# Patient Record
Sex: MALE | Race: WHITE | Employment: FULL TIME | URBAN - METROPOLITAN AREA
[De-identification: names, ages, dates, MRNs, and addresses within clinical notes are randomized per-mention and may not be internally consistent; named-entity substitution may affect disease eponyms.]

---

## 2017-04-10 ENCOUNTER — PRE VISIT (OUTPATIENT)
Dept: CARDIOLOGY | Facility: CLINIC | Age: 62
End: 2017-04-10

## 2017-04-10 DIAGNOSIS — I25.10 CORONARY ARTERY DISEASE INVOLVING NATIVE CORONARY ARTERY OF NATIVE HEART WITHOUT ANGINA PECTORIS: ICD-10-CM

## 2017-04-10 DIAGNOSIS — I21.9 MYOCARDIAL INFARCTION (H): ICD-10-CM

## 2017-04-10 PROBLEM — E78.00 PURE HYPERCHOLESTEROLEMIA: Status: ACTIVE | Noted: 2017-04-10

## 2017-04-20 DIAGNOSIS — I25.10 CORONARY ARTERY DISEASE INVOLVING NATIVE CORONARY ARTERY OF NATIVE HEART WITHOUT ANGINA PECTORIS: ICD-10-CM

## 2017-04-20 LAB
CHOLEST SERPL-MCNC: 156 MG/DL
HDLC SERPL-MCNC: 35 MG/DL
LDLC SERPL CALC-MCNC: 94 MG/DL
NONHDLC SERPL-MCNC: 121 MG/DL
TRIGL SERPL-MCNC: 133 MG/DL

## 2017-04-20 PROCEDURE — 36415 COLL VENOUS BLD VENIPUNCTURE: CPT | Performed by: NURSE PRACTITIONER

## 2017-04-20 PROCEDURE — 80061 LIPID PANEL: CPT | Performed by: NURSE PRACTITIONER

## 2017-04-24 ENCOUNTER — OFFICE VISIT (OUTPATIENT)
Dept: CARDIOLOGY | Facility: CLINIC | Age: 62
End: 2017-04-24
Attending: NURSE PRACTITIONER
Payer: COMMERCIAL

## 2017-04-24 VITALS
WEIGHT: 210.4 LBS | DIASTOLIC BLOOD PRESSURE: 70 MMHG | SYSTOLIC BLOOD PRESSURE: 110 MMHG | BODY MASS INDEX: 28.5 KG/M2 | HEIGHT: 72 IN | HEART RATE: 56 BPM

## 2017-04-24 DIAGNOSIS — Z72.0 TOBACCO ABUSE: ICD-10-CM

## 2017-04-24 DIAGNOSIS — E78.6 HDL DEFICIENCY: Primary | Chronic | ICD-10-CM

## 2017-04-24 DIAGNOSIS — I25.10 CORONARY ARTERY DISEASE INVOLVING NATIVE CORONARY ARTERY OF NATIVE HEART WITHOUT ANGINA PECTORIS: ICD-10-CM

## 2017-04-24 DIAGNOSIS — E78.2 MIXED HYPERLIPIDEMIA: ICD-10-CM

## 2017-04-24 PROCEDURE — 99213 OFFICE O/P EST LOW 20 MIN: CPT | Performed by: INTERNAL MEDICINE

## 2017-04-24 NOTE — PROGRESS NOTES
HPI and Plan:   See dictation    Orders Placed This Encounter   Procedures     Lipid Profile     Lipid Profile     Follow-Up with Cardiac Advanced Practice Provider     Follow-Up with Cardiologist       No orders of the defined types were placed in this encounter.      There are no discontinued medications.      Encounter Diagnoses   Name Primary?     Coronary artery disease involving native coronary artery of native heart without angina pectoris      HDL deficiency Yes     Mixed hyperlipidemia      Tobacco abuse        CURRENT MEDICATIONS:  Current Outpatient Prescriptions   Medication Sig Dispense Refill     atorvastatin (LIPITOR) 80 MG tablet Take 1 tablet (80 mg) by mouth daily 90 tablet 3     atenolol (TENORMIN) 25 MG tablet Take 1 tablet (25 mg) by mouth daily 90 tablet 3     Loratadine (CLARITIN PO) Possibly with D       IBUPROFEN PO Take  by mouth.       aspirin 81 MG tablet Take  by mouth daily.       valACYclovir (VALTREX) 1000 mg tablet Take 1 tablet by mouth 3 times daily for 7 days. 21 tablet 0       ALLERGIES   No Known Allergies    PAST MEDICAL HISTORY:  Past Medical History:   Diagnosis Date     Back pain      CAD (coronary artery disease)     cardiac cath 2006: MARIBELL to PDA     Herpes pharyngitis      Hyperlipidemia LDL goal < 70      Impaired glucose tolerance      Myocardial infarction (H) 10/2006    inferior     Psoriasis      Tobacco abuse        PAST SURGICAL HISTORY:  Past Surgical History:   Procedure Laterality Date     bilateral cataract extractions      w/ IOL implant     HEART CATH, ANGIOPLASTY  OCt  2006    successful stent to PDa,        FAMILY HISTORY:  Family History   Problem Relation Age of Onset     Other Cancer Mother      MENTAL ILLNESS Father        SOCIAL HISTORY:  Social History     Social History     Marital status:      Spouse name: N/A     Number of children: N/A     Years of education: N/A     Social History Main Topics     Smoking status: Current Every Day Smoker      "Packs/day: 0.50     Types: Cigars     Smokeless tobacco: None      Comment: 5 cigars a day     Alcohol use Yes     Drug use: No     Sexual activity: Not Asked     Other Topics Concern     Parent/Sibling W/ Cabg, Mi Or Angioplasty Before 65f 55m? Yes     Caffeine Concern Yes     02-03 cups a day     Sleep Concern No     Stress Concern No     Weight Concern No     Special Diet Yes     eat healthy      Exercise Yes     golf      Seat Belt Yes     Social History Narrative       Review of Systems:  Skin:  Negative       Eyes:         ENT:  Negative      Respiratory:  Negative       Cardiovascular:    Positive for;edema (only when flying)    Gastroenterology: Negative      Genitourinary:  Negative      Musculoskeletal:  Negative      Neurologic:  Negative      Psychiatric:  Negative      Heme/Lymph/Imm:  Negative      Endocrine:  Negative        Physical Exam:  Vitals: /70  Pulse 56  Ht 1.816 m (5' 11.5\")  Wt 95.4 kg (210 lb 6.4 oz)  BMI 28.94 kg/m2    Constitutional:  cooperative;alert and oriented overweight      Skin:  warm and dry to the touch, no apparent skin lesions or masses noted        Head:  normocephalic, no masses or lesions        Eyes:  pupils equal and round, conjunctivae and lids unremarkable, sclera white, no xanthalasma, EOMS intact, no nystagmus        ENT:  no pallor or cyanosis, dentition good        Neck:  carotid pulses are full and equal bilaterally;no carotid bruit        Chest:  normal breath sounds, clear to auscultation, normal A-P diameter, normal symmetry, normal respiratory excursion, no use of accessory muscles          Cardiac: regular rhythm;normal S1 and S2;no S3 or S4;no murmurs, gallops or rubs detected                  Abdomen:           Vascular: pulses full and equal                                        Extremities and Back:  no edema;no spinal abnormalities noted;normal muscle strength and tone              Neurological:  affect appropriate, oriented to time, person " and place;no gross motor deficits              DORINDA Molina CNP  [facility]  [address]     [place] [id number]

## 2017-04-24 NOTE — MR AVS SNAPSHOT
After Visit Summary   4/24/2017    Kenn Naranjo    MRN: [id number]           Patient Information     Date Of Birth          1955        Visit Information        Provider Department      4/24/2017 1:45 PM Martin Govea MD [facility]        Today's Diagnoses     HDL deficiency    -  1    Coronary artery disease involving native coronary artery of native heart without angina pectoris        Mixed hyperlipidemia        Tobacco abuse           Follow-ups after your visit        Additional Services     Follow-Up with Cardiac Advanced Practice Provider           Follow-Up with Cardiologist                 Future tests that were ordered for you today     Open Future Orders        Priority Expected Expires Ordered    Lipid Profile Routine 4/24/2018 5/29/2018 4/24/2017    Follow-Up with Cardiac Advanced Practice Provider Routine 4/24/2018 9/6/2018 4/24/2017    Lipid Profile Routine 4/24/2019 5/14/2019 4/24/2017    Follow-Up with Cardiologist Routine 4/24/2019 5/14/2019 4/24/2017            Who to contact     If you have questions or need follow up information about today's clinic visit or your schedule please contact [facility] directly at [phone].  Normal or non-critical lab and imaging results will be communicated to you by [employer], letter or phone within 4 business days after the clinic has received the results. If you do not hear from us within 7 days, please contact the clinic through [employer] or phone. If you have a critical or abnormal lab result, we will notify you by phone as soon as possible.  Submit refill requests through [employer] or call your pharmacy and they will forward the refill request to us. Please allow 3 business days for your refill to be completed.          Additional Information About Your Visit        MyChart Information     [employer] lets you send messages to your doctor, view your test  "results, renew your prescriptions, schedule appointments and more. To sign up, go to [email or web address] . Click on \"Log in\" on the left side of the screen, which will take you to the Welcome page. Then click on \"Sign up Now\" on the right side of the page.     You will be asked to enter the access code listed below, as well as some personal information. Please follow the directions to create your username and password.     Your access code is: [email or web address]  Expires: 2017  2:34 PM     Your access code will  in 90 days. If you need help or a new code, please call your [facility] clinic or [phone].        Care EveryWhere ID     This is your Care EveryWhere ID. This could be used by other organizations to access your [facility] medical records  [id number]        Your Vitals Were     Pulse Height BMI (Body Mass Index)             56 1.816 m (5' 11.5\") 28.94 kg/m2          Blood Pressure from Last 3 Encounters:   17 110/70   16 98/58   04/20/15 100/64    Weight from Last 3 Encounters:   17 95.4 kg (210 lb 6.4 oz)   16 91.1 kg (200 lb 12.8 oz)   04/20/15 85.3 kg (188 lb)              We Performed the Following     Follow-Up with Cardiologist        Primary Care Provider    Physician No Ref-Primary       No address on file        Thank you!     Thank you for choosing [facility] [place]  for your care. Our goal is always to provide you with excellent care. Hearing back from our patients is one way we can continue to improve our services. Please take a few minutes to complete the written survey that you may receive in the mail after your visit with us. Thank you!             Your Updated Medication List - Protect others around you: Learn how to safely use, store and throw away your medicines at www.disposemymeds.org.          This list is accurate as of: 17  2:34 PM.  Always use your most recent med list.                   Brand Name Dispense " Instructions for use    aspirin 81 MG tablet      Take  by mouth daily.       atenolol 25 MG tablet    TENORMIN    90 tablet    Take 1 tablet (25 mg) by mouth daily       atorvastatin 80 MG tablet    LIPITOR    90 tablet    Take 1 tablet (80 mg) by mouth daily       CLARITIN PO      Possibly with D       IBUPROFEN PO      Take  by mouth.       valACYclovir 1000 mg tablet    VALTREX    21 tablet    Take 1 tablet by mouth 3 times daily for 7 days.

## 2017-04-25 DIAGNOSIS — I25.10 CORONARY ARTERY DISEASE INVOLVING NATIVE CORONARY ARTERY OF NATIVE HEART WITHOUT ANGINA PECTORIS: ICD-10-CM

## 2017-04-25 RX ORDER — ATENOLOL 25 MG/1
25 TABLET ORAL DAILY
Qty: 90 TABLET | Refills: 3 | Status: SHIPPED | OUTPATIENT
Start: 2017-04-25 | End: 2017-07-25 | Stop reason: ALTCHOICE

## 2017-04-25 NOTE — PROGRESS NOTES
HISTORY OF PRESENT ILLNESS:  Mr. Naranjo is a very nice 62-year-old gentleman with past medical history significant for inferior wall myocardial infarction in 10/2006, treated by stenting of his posterior descending artery.  At that time he was noted to have a 70% stenosis in his left anterior descending artery and a 70% stenosis in the circumflex coronary artery.  When he returned for staged procedure, the stenosis in the left anterior descending artery had improved significantly and it was thought to be more in the 30%-50% range.  His mid circumflex stenosis was still in the 70% range; however, the vessel beyond it was quite small, and we decided to treat him medically.  Ejection fraction improved all the way back to 60% with some inferior wall hypokinesia.  He fortunately quit smoking cigarettes at that time, but still smokes occasional cigars.      His last followup stress test was in 2012 demonstrating a very small area of completely reversible basal inferior defect consistent with a mild amount of ischemia, and it was decided to treat him medically.  Ejection fraction was estimated to be 65%.      Aaron returns to clinic stating he is doing quite well, but he states he has been a total slug.  He did nothing over the wintertime but gain weight and sit around.  He states his wife is still working.  He is retired.  He has got a juan that is now retiring this Friday, and he states they are going to start a health program together with some regular exercise and trying to focus on what they are eating.  He has no chest, arm, neck, jaw or shoulder discomfort, no dyspnea on exertion, orthopnea or PND, no palpitations, lightheadedness, dizziness, syncope or near-syncope.  He did a bunch of yard work over the weekend and had absolutely no symptoms.  He notes no side effects or problems with his current medical regimen.      ASSESSMENT AND PLAN:  Aaron appears to be doing well from a cardiac standpoint without clinical  evidence of ischemia, heart failure or significant arrhythmia.      Blood pressure is very well controlled at 110/70.      Weight, unfortunately, is now up to 210 pounds.  He is up 10 pounds from last year and 12 pounds from the year before.  Body mass index is now 29, pushing towards obesity.      I also pointed out his fasting lipid profile has done the expected seesaw phenomenon.  His total cholesterol has gone up by 20 points to 156, his HDL has gone down from 38 to 35, LDL has gone from 76 up to 94, triglycerides are now up to 133, whereas they were 78 three years ago.  Again, I emphasized the importance of a regular exercise regimen, weight loss and a low-carbohydrate diet.  I offered to consider moving from atorvastatin 80 to Crestor 40, and at this time we decided we will focus on lifestyle.      We reviewed his medications.  We will continue them as is.  I will have him follow up with my ELAINE in 1 year.  I will see him back in 2 years.  If he should have any problems, I would be glad to see him sooner.         VALERIE IRENE MD, [facility]             D: 2017 14:34   T: 2017 04:29   MT: ANKIT      Name:     BABITA JIMENEZ   MRN:      [id number]        Account:      [id number]   :      1955           Service Date: 2017      Document: [id number]

## 2017-06-19 DIAGNOSIS — I25.10 CORONARY ARTERY DISEASE INVOLVING NATIVE CORONARY ARTERY OF NATIVE HEART WITHOUT ANGINA PECTORIS: ICD-10-CM

## 2017-06-19 RX ORDER — ATORVASTATIN CALCIUM 80 MG/1
80 TABLET, FILM COATED ORAL DAILY
Qty: 90 TABLET | Refills: 2 | Status: SHIPPED | OUTPATIENT
Start: 2017-06-19 | End: 2018-03-14

## 2017-07-24 ENCOUNTER — TELEPHONE (OUTPATIENT)
Dept: CARDIOLOGY | Facility: CLINIC | Age: 62
End: 2017-07-24

## 2017-07-24 DIAGNOSIS — I10 BENIGN ESSENTIAL HYPERTENSION: Primary | ICD-10-CM

## 2017-07-24 NOTE — TELEPHONE ENCOUNTER
[facility], [place] called requesting a Substitute  for atenolol 25 mg one tablet daily, which is on nation back order  Last seen 4-24-17 by Dr. Mendez.

## 2017-07-25 RX ORDER — METOPROLOL SUCCINATE 25 MG/1
12.5 TABLET, EXTENDED RELEASE ORAL DAILY
Qty: 15 TABLET | Refills: 1 | Status: SHIPPED | OUTPATIENT
Start: 2017-07-25 | End: 2017-09-21

## 2017-07-25 NOTE — TELEPHONE ENCOUNTER
Spoke with patient and alternative for atenolol reviewed. Patient verbalized understanding of new medication and dosage. Prescription for Metoprolol Succinate 1/2 table (12.5 mg) daily e scribed. Patient will call with any questions or concerns.

## 2017-09-21 DIAGNOSIS — I10 BENIGN ESSENTIAL HYPERTENSION: ICD-10-CM

## 2017-09-21 RX ORDER — METOPROLOL SUCCINATE 25 MG/1
12.5 TABLET, EXTENDED RELEASE ORAL DAILY
Qty: 45 TABLET | Refills: 2 | Status: SHIPPED | OUTPATIENT
Start: 2017-09-21 | End: 2018-04-25

## 2018-03-14 DIAGNOSIS — I25.10 CORONARY ARTERY DISEASE INVOLVING NATIVE CORONARY ARTERY OF NATIVE HEART WITHOUT ANGINA PECTORIS: ICD-10-CM

## 2018-03-14 RX ORDER — ATORVASTATIN CALCIUM 80 MG/1
80 TABLET, FILM COATED ORAL DAILY
Qty: 90 TABLET | Refills: 0 | Status: SHIPPED | OUTPATIENT
Start: 2018-03-14 | End: 2018-04-25

## 2018-04-25 ENCOUNTER — OFFICE VISIT (OUTPATIENT)
Dept: CARDIOLOGY | Facility: CLINIC | Age: 63
End: 2018-04-25
Attending: INTERNAL MEDICINE
Payer: COMMERCIAL

## 2018-04-25 VITALS
HEART RATE: 76 BPM | SYSTOLIC BLOOD PRESSURE: 110 MMHG | DIASTOLIC BLOOD PRESSURE: 69 MMHG | BODY MASS INDEX: 27.22 KG/M2 | HEIGHT: 72 IN | WEIGHT: 201 LBS

## 2018-04-25 DIAGNOSIS — I25.10 CORONARY ARTERY DISEASE INVOLVING NATIVE CORONARY ARTERY OF NATIVE HEART WITHOUT ANGINA PECTORIS: ICD-10-CM

## 2018-04-25 LAB
CHOLEST SERPL-MCNC: 132 MG/DL
HDLC SERPL-MCNC: 32 MG/DL
LDLC SERPL CALC-MCNC: 72 MG/DL
NONHDLC SERPL-MCNC: 100 MG/DL
TRIGL SERPL-MCNC: 139 MG/DL

## 2018-04-25 PROCEDURE — 36415 COLL VENOUS BLD VENIPUNCTURE: CPT | Performed by: INTERNAL MEDICINE

## 2018-04-25 PROCEDURE — 80061 LIPID PANEL: CPT | Performed by: INTERNAL MEDICINE

## 2018-04-25 PROCEDURE — 99213 OFFICE O/P EST LOW 20 MIN: CPT | Performed by: NURSE PRACTITIONER

## 2018-04-25 RX ORDER — ATENOLOL 25 MG/1
25 TABLET ORAL DAILY
Qty: 90 TABLET | Refills: 3 | Status: SHIPPED | OUTPATIENT
Start: 2018-04-25 | End: 2019-04-22

## 2018-04-25 RX ORDER — ATORVASTATIN CALCIUM 80 MG/1
80 TABLET, FILM COATED ORAL DAILY
Qty: 90 TABLET | Refills: 3 | Status: SHIPPED | OUTPATIENT
Start: 2018-04-25 | End: 2019-06-12

## 2018-04-25 NOTE — LETTER
4/25/2018    Physician No Ref-Primary  No address on file    RE: Kenn Mercermitz       Dear Colleague,    I had the pleasure of seeing Kenn Naranjo in the [facility].    HPI and Plan:   I had the pleasure of seeing Kenn Naranjo today in cardiology annual follow-up.  He is a pleasant 63-year-old patient of Dr. Govea's.  His past medical history is significant for inferior wall myocardial infarction in October 2006 treated with stenting of his posterior descending artery.  He was noted at that time to have a 70% LAD stenosis and a 70% circumflex stenosis, he returned for a staged procedure, on relook the LAD had improved significantly and was thought to be more than 30-50% range.  His circumflex stenosis was still 70%, but it was decided to treat medically given the distal vessel was quite small.  His EF improved to 60% with some inferior wall hypokinesis.  He quit smoking cigarettes at that time, he occasionally smokes a cigar.    He had a stress echo 2012 demonstrating a very small area of completely reversible basal inferior defect consistent with a mild amount of ischemia, gas was treated medically and his EF was normal.    He saw Dr. Govea last year and was doing well but he gained at least 10 pounds and was admittedly not exercising.  Fortunately he is managed to take off most of that weight and is back down to 201 pounds today.  He says the last couple of months he has an exercise but before that he was.  He plans to start again, he is golfing this weekend and plans to walk and carry his back.  He has not had any exertional chest pain, shortness of breath, PND or orthopnea.  He was recently shoveling or heavy wet snow and had no symptoms with this.  He has chronic bilateral puffy ankles, they are unchanged and do not bother him.    Labs Reviewed: , HDL 32, LDL 72, triglycerides 130    Physical Exam  Please see Below     Assessment and Plan  1.  Coronary artery  disease with previous posterior descending artery stenting in 2006, his return angiogram for a staged procedure showed that the LAD artery stenosis had improved to 30-50% and his circumflex 70% stenosis was treated medically.  He continues to do well.  Is not having any ischemic symptoms.  He has no limitations.  He continues on atenolol, Lipitor and aspirin.  Of note he was previously on atenolol, but last summer there was a shortage and he was switched to Toprol XL 12.5 mg, he does not like cutting this large tablet in half and request to go back on atenolol 25, which I prescribed for him today.  I congratulated him on his 10 pound weight loss and encouraged him to continue to exercise and lose weight.  His blood pressure is well controlled.  He should follow-up with Dr. Govea in one years time with lipids prior.      DORINDA Oneal, CNP      No orders of the defined types were placed in this encounter.    Orders Placed This Encounter   Medications     atenolol (TENORMIN) 25 MG tablet     Sig: Take 1 tablet (25 mg) by mouth daily     Dispense:  90 tablet     Refill:  3     atorvastatin (LIPITOR) 80 MG tablet     Sig: Take 1 tablet (80 mg) by mouth daily     Dispense:  90 tablet     Refill:  3     Medications Discontinued During This Encounter   Medication Reason     valACYclovir (VALTREX) 1000 mg tablet Stopped by Patient     metoprolol (TOPROL-XL) 25 MG 24 hr tablet      atorvastatin (LIPITOR) 80 MG tablet Reorder         CURRENT MEDICATIONS:  Current Outpatient Prescriptions   Medication Sig Dispense Refill     aspirin 81 MG tablet Take  by mouth daily.       atenolol (TENORMIN) 25 MG tablet Take 1 tablet (25 mg) by mouth daily 90 tablet 3     atorvastatin (LIPITOR) 80 MG tablet Take 1 tablet (80 mg) by mouth daily 90 tablet 3     IBUPROFEN PO Take  by mouth.       Loratadine (CLARITIN PO) Possibly with D       [DISCONTINUED] atorvastatin (LIPITOR) 80 MG tablet Take 1 tablet (80 mg) by mouth daily 90  "tablet 0       ALLERGIES   No Known Allergies    PAST MEDICAL HISTORY:  Past Medical History:   Diagnosis Date     Back pain      CAD (coronary artery disease)     cardiac cath 2006: MARIBELL to PDA     Herpes pharyngitis      Hyperlipidemia LDL goal < 70      Impaired glucose tolerance      Myocardial infarction 10/2006    inferior     Psoriasis      Tobacco abuse        PAST SURGICAL HISTORY:  Past Surgical History:   Procedure Laterality Date     bilateral cataract extractions      w/ IOL implant     HEART CATH, ANGIOPLASTY  OCt  2006    successful stent to PDa,        FAMILY HISTORY:  Family History   Problem Relation Age of Onset     Other Cancer Mother      MENTAL ILLNESS Father        SOCIAL HISTORY:  Social History     Social History     Marital status:      Spouse name: N/A     Number of children: N/A     Years of education: N/A     Social History Main Topics     Smoking status: Current Every Day Smoker     Packs/day: 0.50     Types: Cigars     Smokeless tobacco: Never Used      Comment: 5 cigars a day     Alcohol use Yes     Drug use: No     Sexual activity: Not Asked     Other Topics Concern     Parent/Sibling W/ Cabg, Mi Or Angioplasty Before 65f 55m? Yes     Caffeine Concern Yes     02-03 cups a day     Sleep Concern No     Stress Concern No     Weight Concern No     Special Diet Yes     eat healthy      Exercise Yes     golf      Seat Belt Yes     Social History Narrative       Review of Systems:  Skin:  Negative       Eyes:  Negative      ENT:  Negative      Respiratory:  Negative       Cardiovascular:  Negative      Gastroenterology: Negative      Genitourinary:  Negative      Musculoskeletal:  Negative      Neurologic:  Negative      Psychiatric:  Negative      Heme/Lymph/Imm:  Negative      Endocrine:  Negative        Physical Exam:  Vitals: /69  Pulse 76  Ht 1.816 m (5' 11.5\")  Wt 91.2 kg (201 lb)  BMI 27.64 kg/m2    Constitutional:  cooperative;in no acute distress        Skin:  warm " and dry to the touch, no apparent skin lesions or masses noted          Head:  normocephalic, no masses or lesions        Eyes:  pupils equal and round, conjunctivae and lids unremarkable, sclera white, no xanthalasma, EOMS intact, no nystagmus        Lymph:      ENT:  no pallor or cyanosis, dentition good        Neck:  carotid pulses are full and equal bilaterally;no carotid bruit        Respiratory:  normal breath sounds, clear to auscultation, normal A-P diameter, normal symmetry, normal respiratory excursion, no use of accessory muscles         Cardiac: regular rhythm;normal S1 and S2;no S3 or S4;no murmurs, gallops or rubs detected                pulses full and equal                                        GI:  abdomen soft        Extremities and Muscular Skeletal:  no edema;no spinal abnormalities noted;normal muscle strength and tone              Neurological:  no gross motor deficits;affect appropriate        Psych:  Alert and Oriented x 3    Encounter Diagnosis   Name Primary?     Coronary artery disease involving native coronary artery of native heart without angina pectoris        Recent Lab Results:  LIPID RESULTS:  Lab Results   Component Value Date    CHOL 132 04/25/2018    HDL 32 (L) 04/25/2018    LDL 72 04/25/2018    TRIG 139 04/25/2018    CHOLHDLRATIO 3.4 04/20/2015       LIVER ENZYME RESULTS:  Lab Results   Component Value Date    AST 25 05/04/2006    ALT <5 (L) 11/20/2014       CBC RESULTS:  Lab Results   Component Value Date    WBC 10.1 06/15/2013    RBC 5.17 06/15/2013    HGB 16.0 06/15/2013    HCT 46.1 06/15/2013    MCV 89 06/15/2013    MCH 30.9 06/15/2013    MCHC 34.7 06/15/2013    RDW 13.1 06/15/2013     06/15/2013       BMP RESULTS:  Lab Results   Component Value Date     06/15/2013    POTASSIUM 4.0 06/15/2013    CHLORIDE 104 06/15/2013    CO2 22 06/15/2013    ANIONGAP 10 06/15/2013     (H) 06/15/2013    BUN 16 06/15/2013    CR 0.83 06/15/2013    GFRESTIMATED >90  06/15/2013    GFRESTBLACK >90 06/15/2013    CHIP 9.5 06/15/2013        A1C RESULTS:  No results found for: A1C    INR RESULTS:  Lab Results   Component Value Date    INR 0.96 11/20/2006    INR 0.99 10/31/2006           CC  Martin Govea MD  [address]  [place] [id number]                  Thank you for allowing me to participate in the care of your patient.      Sincerely,     DORINDA [facility]    cc:   Martin Govea MD  [address]  [place] [id number]

## 2018-04-25 NOTE — MR AVS SNAPSHOT
"              After Visit Summary   2018    Kenn Naranjo    MRN: [id number]           Patient Information     Date Of Birth          1955        Visit Information        Provider Department      2018 10:00 AM Dania Rodríguez APRN CNP [facility]        Today's Diagnoses     Coronary artery disease involving native coronary artery of native heart without angina pectoris           Follow-ups after your visit        Who to contact     If you have questions or need follow up information about today's clinic visit or your schedule please contact [facility] directly at [phone].  Normal or non-critical lab and imaging results will be communicated to you by [employer], letter or phone within 4 business days after the clinic has received the results. If you do not hear from us within 7 days, please contact the clinic through [employer] or phone. If you have a critical or abnormal lab result, we will notify you by phone as soon as possible.  Submit refill requests through [employer] or call your pharmacy and they will forward the refill request to us. Please allow 3 business days for your refill to be completed.          Additional Information About Your Visit        MyChart Information     [employer] lets you send messages to your doctor, view your test results, renew your prescriptions, schedule appointments and more. To sign up, go to [email or web address] . Click on \"Log in\" on the left side of the screen, which will take you to the Welcome page. Then click on \"Sign up Now\" on the right side of the page.     You will be asked to enter the access code listed below, as well as some personal information. Please follow the directions to create your username and password.     Your access code is: [email or web address]  Expires: 2018 10:13 AM     Your access code will  in 90 days. If you need help or a new code, please call " "your [place] clinic or [phone].        Care EveryWhere ID     This is your Care EveryWhere ID. This could be used by other organizations to access your [place] medical records  [id number]        Your Vitals Were     Pulse Height BMI (Body Mass Index)             76 1.816 m (5' 11.5\") 27.64 kg/m2          Blood Pressure from Last 3 Encounters:   04/25/18 110/69   04/24/17 110/70   04/18/16 98/58    Weight from Last 3 Encounters:   04/25/18 91.2 kg (201 lb)   04/24/17 95.4 kg (210 lb 6.4 oz)   04/18/16 91.1 kg (200 lb 12.8 oz)              We Performed the Following     Follow-Up with Cardiac Advanced Practice Provider          Today's Medication Changes          These changes are accurate as of 4/25/18 10:13 AM.  If you have any questions, ask your nurse or doctor.               Start taking these medicines.        Dose/Directions    atenolol 25 MG tablet   Commonly known as:  TENORMIN   Used for:  Coronary artery disease involving native coronary artery of native heart without angina pectoris   Started by:  Dania Rodríguez APRN CNP        Dose:  25 mg   Take 1 tablet (25 mg) by mouth daily   Quantity:  90 tablet   Refills:  3         Stop taking these medicines if you haven't already. Please contact your care team if you have questions.     metoprolol succinate 25 MG 24 hr tablet   Commonly known as:  TOPROL-XL   Stopped by:  Dania Rodríguez APRN CNP                Where to get your medicines      These medications were sent to [place] [id number] [place] [id number]  [address] [facility] [address]     Phone:  [phone]     atenolol 25 MG tablet    atorvastatin 80 MG tablet                Primary Care Provider Fax #    Physician No Ref-Primary [phone]       No address on file        Equal Access to Services     JULIEN HILLIARD AH: Malgorzata Finley, waaxda luqadaha, qaybta kaalmada adeegyada, luan guzman. So [facility] " [phone].    ATENCIÓN: Si norberto fry, tiene a ortiz disposición servicios gratuitos de asistencia lingüística. Octavia al [phone].    We comply with applicable federal civil rights laws and [place] laws. We do not discriminate on the basis of race, color, national origin, age, disability, sex, sexual orientation, or gender identity.            Thank you!     Thank you for choosing [facility]  for your care. Our goal is always to provide you with excellent care. Hearing back from our patients is one way we can continue to improve our services. Please take a few minutes to complete the written survey that you may receive in the mail after your visit with us. Thank you!             Your Updated Medication List - Protect others around you: Learn how to safely use, store and throw away your medicines at www.disposemymeds.org.          This list is accurate as of 4/25/18 10:13 AM.  Always use your most recent med list.                   Brand Name Dispense Instructions for use Diagnosis    aspirin 81 MG tablet      Take  by mouth daily.        atenolol 25 MG tablet    TENORMIN    90 tablet    Take 1 tablet (25 mg) by mouth daily    Coronary artery disease involving native coronary artery of native heart without angina pectoris       atorvastatin 80 MG tablet    LIPITOR    90 tablet    Take 1 tablet (80 mg) by mouth daily    Coronary artery disease involving native coronary artery of native heart without angina pectoris       CLARITIN PO      Possibly with D        IBUPROFEN PO      Take  by mouth.

## 2018-04-25 NOTE — PROGRESS NOTES
HPI and Plan:   I had the pleasure of seeing Kenn Naranjo today in cardiology annual follow-up.  He is a pleasant 63-year-old patient of Dr. Govea's.  His past medical history is significant for inferior wall myocardial infarction in October 2006 treated with stenting of his posterior descending artery.  He was noted at that time to have a 70% LAD stenosis and a 70% circumflex stenosis, he returned for a staged procedure, on relook the LAD had improved significantly and was thought to be more than 30-50% range.  His circumflex stenosis was still 70%, but it was decided to treat medically given the distal vessel was quite small.  His EF improved to 60% with some inferior wall hypokinesis.  He quit smoking cigarettes at that time, he occasionally smokes a cigar.    He had a stress echo 2012 demonstrating a very small area of completely reversible basal inferior defect consistent with a mild amount of ischemia, gas was treated medically and his EF was normal.    He saw Dr. Govea last year and was doing well but he gained at least 10 pounds and was admittedly not exercising.  Fortunately he is managed to take off most of that weight and is back down to 201 pounds today.  He says the last couple of months he has an exercise but before that he was.  He plans to start again, he is golfing this weekend and plans to walk and carry his back.  He has not had any exertional chest pain, shortness of breath, PND or orthopnea.  He was recently shoveling or heavy wet snow and had no symptoms with this.  He has chronic bilateral puffy ankles, they are unchanged and do not bother him.    Labs Reviewed: , HDL 32, LDL 72, triglycerides 130    Physical Exam  Please see Below     Assessment and Plan  1.  Coronary artery disease with previous posterior descending artery stenting in 2006, his return angiogram for a staged procedure showed that the LAD artery stenosis had improved to 30-50% and his circumflex 70% stenosis was  treated medically.  He continues to do well.  Is not having any ischemic symptoms.  He has no limitations.  He continues on atenolol, Lipitor and aspirin.  Of note he was previously on atenolol, but last summer there was a shortage and he was switched to Toprol XL 12.5 mg, he does not like cutting this large tablet in half and request to go back on atenolol 25, which I prescribed for him today.  I congratulated him on his 10 pound weight loss and encouraged him to continue to exercise and lose weight.  His blood pressure is well controlled.  He should follow-up with Dr. Govea in one years time with lipids prior.      DORINDA Oneal, CNP      No orders of the defined types were placed in this encounter.    Orders Placed This Encounter   Medications     atenolol (TENORMIN) 25 MG tablet     Sig: Take 1 tablet (25 mg) by mouth daily     Dispense:  90 tablet     Refill:  3     atorvastatin (LIPITOR) 80 MG tablet     Sig: Take 1 tablet (80 mg) by mouth daily     Dispense:  90 tablet     Refill:  3     Medications Discontinued During This Encounter   Medication Reason     valACYclovir (VALTREX) 1000 mg tablet Stopped by Patient     metoprolol (TOPROL-XL) 25 MG 24 hr tablet      atorvastatin (LIPITOR) 80 MG tablet Reorder         CURRENT MEDICATIONS:  Current Outpatient Prescriptions   Medication Sig Dispense Refill     aspirin 81 MG tablet Take  by mouth daily.       atenolol (TENORMIN) 25 MG tablet Take 1 tablet (25 mg) by mouth daily 90 tablet 3     atorvastatin (LIPITOR) 80 MG tablet Take 1 tablet (80 mg) by mouth daily 90 tablet 3     IBUPROFEN PO Take  by mouth.       Loratadine (CLARITIN PO) Possibly with D       [DISCONTINUED] atorvastatin (LIPITOR) 80 MG tablet Take 1 tablet (80 mg) by mouth daily 90 tablet 0       ALLERGIES   No Known Allergies    PAST MEDICAL HISTORY:  Past Medical History:   Diagnosis Date     Back pain      CAD (coronary artery disease)     cardiac cath 2006: MARIBELL to PDA     Herpes  "pharyngitis      Hyperlipidemia LDL goal < 70      Impaired glucose tolerance      Myocardial infarction 10/2006    inferior     Psoriasis      Tobacco abuse        PAST SURGICAL HISTORY:  Past Surgical History:   Procedure Laterality Date     bilateral cataract extractions      w/ IOL implant     HEART CATH, ANGIOPLASTY  OCt  2006    successful stent to PDa,        FAMILY HISTORY:  Family History   Problem Relation Age of Onset     Other Cancer Mother      MENTAL ILLNESS Father        SOCIAL HISTORY:  Social History     Social History     Marital status:      Spouse name: N/A     Number of children: N/A     Years of education: N/A     Social History Main Topics     Smoking status: Current Every Day Smoker     Packs/day: 0.50     Types: Cigars     Smokeless tobacco: Never Used      Comment: 5 cigars a day     Alcohol use Yes     Drug use: No     Sexual activity: Not Asked     Other Topics Concern     Parent/Sibling W/ Cabg, Mi Or Angioplasty Before 65f 55m? Yes     Caffeine Concern Yes     02-03 cups a day     Sleep Concern No     Stress Concern No     Weight Concern No     Special Diet Yes     eat healthy      Exercise Yes     golf      Seat Belt Yes     Social History Narrative       Review of Systems:  Skin:  Negative       Eyes:  Negative      ENT:  Negative      Respiratory:  Negative       Cardiovascular:  Negative      Gastroenterology: Negative      Genitourinary:  Negative      Musculoskeletal:  Negative      Neurologic:  Negative      Psychiatric:  Negative      Heme/Lymph/Imm:  Negative      Endocrine:  Negative        Physical Exam:  Vitals: /69  Pulse 76  Ht 1.816 m (5' 11.5\")  Wt 91.2 kg (201 lb)  BMI 27.64 kg/m2    Constitutional:  cooperative;in no acute distress        Skin:  warm and dry to the touch, no apparent skin lesions or masses noted          Head:  normocephalic, no masses or lesions        Eyes:  pupils equal and round, conjunctivae and lids unremarkable, sclera white, no " xanthalasma, EOMS intact, no nystagmus        Lymph:      ENT:  no pallor or cyanosis, dentition good        Neck:  carotid pulses are full and equal bilaterally;no carotid bruit        Respiratory:  normal breath sounds, clear to auscultation, normal A-P diameter, normal symmetry, normal respiratory excursion, no use of accessory muscles         Cardiac: regular rhythm;normal S1 and S2;no S3 or S4;no murmurs, gallops or rubs detected                pulses full and equal                                        GI:  abdomen soft        Extremities and Muscular Skeletal:  no edema;no spinal abnormalities noted;normal muscle strength and tone              Neurological:  no gross motor deficits;affect appropriate        Psych:  Alert and Oriented x 3    Encounter Diagnosis   Name Primary?     Coronary artery disease involving native coronary artery of native heart without angina pectoris        Recent Lab Results:  LIPID RESULTS:  Lab Results   Component Value Date    CHOL 132 04/25/2018    HDL 32 (L) 04/25/2018    LDL 72 04/25/2018    TRIG 139 04/25/2018    CHOLHDLRATIO 3.4 04/20/2015       LIVER ENZYME RESULTS:  Lab Results   Component Value Date    AST 25 05/04/2006    ALT <5 (L) 11/20/2014       CBC RESULTS:  Lab Results   Component Value Date    WBC 10.1 06/15/2013    RBC 5.17 06/15/2013    HGB 16.0 06/15/2013    HCT 46.1 06/15/2013    MCV 89 06/15/2013    MCH 30.9 06/15/2013    MCHC 34.7 06/15/2013    RDW 13.1 06/15/2013     06/15/2013       BMP RESULTS:  Lab Results   Component Value Date     06/15/2013    POTASSIUM 4.0 06/15/2013    CHLORIDE 104 06/15/2013    CO2 22 06/15/2013    ANIONGAP 10 06/15/2013     (H) 06/15/2013    BUN 16 06/15/2013    CR 0.83 06/15/2013    GFRESTIMATED >90 06/15/2013    GFRESTBLACK >90 06/15/2013    CHIP 9.5 06/15/2013        A1C RESULTS:  No results found for: A1C    INR RESULTS:  Lab Results   Component Value Date    INR 0.96 11/20/2006    INR 0.99 10/31/2006            CC  Martin Govea MD  [address]  [place] [id number]

## 2019-04-22 DIAGNOSIS — I25.10 CORONARY ARTERY DISEASE INVOLVING NATIVE CORONARY ARTERY OF NATIVE HEART WITHOUT ANGINA PECTORIS: ICD-10-CM

## 2019-04-22 RX ORDER — ATENOLOL 25 MG/1
25 TABLET ORAL DAILY
Qty: 90 TABLET | Refills: 0 | Status: SHIPPED | OUTPATIENT
Start: 2019-04-22 | End: 2019-07-22

## 2019-05-17 ENCOUNTER — PRE VISIT (OUTPATIENT)
Dept: CARDIOLOGY | Facility: CLINIC | Age: 64
End: 2019-05-17

## 2019-05-17 DIAGNOSIS — E78.2 MIXED HYPERLIPIDEMIA: Primary | ICD-10-CM

## 2019-05-17 DIAGNOSIS — R60.0 PERIPHERAL EDEMA: ICD-10-CM

## 2019-06-12 DIAGNOSIS — I25.10 CORONARY ARTERY DISEASE INVOLVING NATIVE CORONARY ARTERY OF NATIVE HEART WITHOUT ANGINA PECTORIS: ICD-10-CM

## 2019-06-12 RX ORDER — ATORVASTATIN CALCIUM 80 MG/1
80 TABLET, FILM COATED ORAL DAILY
Qty: 90 TABLET | Refills: 0 | Status: SHIPPED | OUTPATIENT
Start: 2019-06-12 | End: 2019-06-14

## 2019-06-14 ENCOUNTER — OFFICE VISIT (OUTPATIENT)
Dept: CARDIOLOGY | Facility: CLINIC | Age: 64
End: 2019-06-14
Payer: COMMERCIAL

## 2019-06-14 VITALS
OXYGEN SATURATION: 97 % | SYSTOLIC BLOOD PRESSURE: 94 MMHG | HEART RATE: 50 BPM | DIASTOLIC BLOOD PRESSURE: 60 MMHG | WEIGHT: 203 LBS | BODY MASS INDEX: 27.92 KG/M2

## 2019-06-14 DIAGNOSIS — E78.6 HDL DEFICIENCY: Chronic | ICD-10-CM

## 2019-06-14 DIAGNOSIS — I87.2 VENOUS (PERIPHERAL) INSUFFICIENCY: Chronic | ICD-10-CM

## 2019-06-14 DIAGNOSIS — R60.0 PERIPHERAL EDEMA: ICD-10-CM

## 2019-06-14 DIAGNOSIS — E78.2 MIXED HYPERLIPIDEMIA: ICD-10-CM

## 2019-06-14 DIAGNOSIS — I25.10 CORONARY ARTERY DISEASE INVOLVING NATIVE CORONARY ARTERY OF NATIVE HEART WITHOUT ANGINA PECTORIS: Primary | ICD-10-CM

## 2019-06-14 LAB
CHOLEST SERPL-MCNC: 123 MG/DL
HDLC SERPL-MCNC: 33 MG/DL
LDLC SERPL CALC-MCNC: 74 MG/DL
NONHDLC SERPL-MCNC: 90 MG/DL
TRIGL SERPL-MCNC: 78 MG/DL

## 2019-06-14 PROCEDURE — 99214 OFFICE O/P EST MOD 30 MIN: CPT | Performed by: INTERNAL MEDICINE

## 2019-06-14 PROCEDURE — 80061 LIPID PANEL: CPT | Performed by: INTERNAL MEDICINE

## 2019-06-14 PROCEDURE — 36415 COLL VENOUS BLD VENIPUNCTURE: CPT | Performed by: INTERNAL MEDICINE

## 2019-06-14 RX ORDER — ROSUVASTATIN CALCIUM 40 MG/1
40 TABLET, COATED ORAL DAILY
Qty: 90 TABLET | Refills: 3 | Status: SHIPPED | OUTPATIENT
Start: 2019-06-14 | End: 2019-07-16 | Stop reason: SINTOL

## 2019-06-14 NOTE — LETTER
6/14/2019    Physician No Ref-Primary  No address on file    RE: Kenn Naranjo       Dear Colleague,    I had the pleasure of seeing Kenn Naranjo in the [facility].    HPI and Plan:   See dictation    Orders Placed This Encounter   Procedures     Lipid Profile     Lipid Profile     ALT     Follow-Up with Cardiac Advanced Practice Provider     Follow-Up with Cardiologist       Orders Placed This Encounter   Medications     rosuvastatin (CRESTOR) 40 MG tablet     Sig: Take 1 tablet (40 mg) by mouth daily     Dispense:  90 tablet     Refill:  3       Medications Discontinued During This Encounter   Medication Reason     atorvastatin (LIPITOR) 80 MG tablet          Encounter Diagnoses   Name Primary?     Coronary artery disease involving native coronary artery of native heart without angina pectoris Yes     Mixed hyperlipidemia      HDL deficiency      Peripheral edema      Venous (peripheral) insufficiency        CURRENT MEDICATIONS:  Current Outpatient Medications   Medication Sig Dispense Refill     aspirin 81 MG tablet Take  by mouth daily.       atenolol (TENORMIN) 25 MG tablet Take 1 tablet (25 mg) by mouth daily 90 tablet 0     IBUPROFEN PO Take  by mouth.       Loratadine (CLARITIN PO) Possibly with D       rosuvastatin (CRESTOR) 40 MG tablet Take 1 tablet (40 mg) by mouth daily 90 tablet 3       ALLERGIES   No Known Allergies    PAST MEDICAL HISTORY:  Past Medical History:   Diagnosis Date     Back pain      CAD (coronary artery disease)     cardiac cath 2006: MARIBELL to PDA     Herpes pharyngitis      Hyperlipidemia LDL goal < 70      Impaired glucose tolerance      Myocardial infarction (H) 10/2006    inferior     Peripheral edema      Psoriasis      Tobacco abuse        PAST SURGICAL HISTORY:  Past Surgical History:   Procedure Laterality Date     bilateral cataract extractions      w/ IOL implant     HEART CATH, ANGIOPLASTY  OCt  2006    successful stent to PDa,        FAMILY  HISTORY:  Family History   Problem Relation Age of Onset     Other Cancer Mother      Mental Illness Father        SOCIAL HISTORY:  Social History     Socioeconomic History     Marital status:      Spouse name: None     Number of children: None     Years of education: None     Highest education level: None   Occupational History     None   Social Needs     Financial resource strain: None     Food insecurity:     Worry: None     Inability: None     Transportation needs:     Medical: None     Non-medical: None   Tobacco Use     Smoking status: Current Every Day Smoker     Packs/day: 0.50     Types: Cigars     Smokeless tobacco: Never Used     Tobacco comment: 5 cigars a day   Substance and Sexual Activity     Alcohol use: Yes     Drug use: No     Sexual activity: None   Lifestyle     Physical activity:     Days per week: None     Minutes per session: None     Stress: None   Relationships     Social connections:     Talks on phone: None     Gets together: None     Attends Congregational service: None     Active member of club or organization: None     Attends meetings of clubs or organizations: None     Relationship status: None     Intimate partner violence:     Fear of current or ex partner: None     Emotionally abused: None     Physically abused: None     Forced sexual activity: None   Other Topics Concern     Parent/sibling w/ CABG, MI or angioplasty before 65F 55M? Yes      Service Not Asked     Blood Transfusions Not Asked     Caffeine Concern Yes     Comment: 02-03 cups a day     Occupational Exposure Not Asked     Hobby Hazards Not Asked     Sleep Concern No     Stress Concern No     Weight Concern No     Special Diet Yes     Comment: eat healthy      Back Care Not Asked     Exercise Yes     Comment: golf      Bike Helmet Not Asked     Seat Belt Yes     Self-Exams Not Asked   Social History Narrative     None       Review of Systems:  Skin:  Negative       Eyes:  Negative      ENT:  Negative       Respiratory:  Negative       Cardiovascular:  Negative Positive for;edema    Gastroenterology: Negative      Genitourinary:         Musculoskeletal:  Negative      Neurologic:  Negative      Psychiatric:         Heme/Lymph/Imm:  Negative      Endocrine:  Negative        Physical Exam:  Vitals: BP 94/60   Pulse 50   Wt 92.1 kg (203 lb)   SpO2 97%   BMI 27.92 kg/m       Constitutional:  cooperative;in no acute distress overweight      Skin:  warm and dry to the touch, no apparent skin lesions or masses noted          Head:  normocephalic, no masses or lesions        Eyes:  pupils equal and round, conjunctivae and lids unremarkable, sclera white, no xanthalasma, EOMS intact, no nystagmus xanthalasma      Lymph:      ENT:  no pallor or cyanosis, dentition good        Neck:  carotid pulses are full and equal bilaterally;no carotid bruit        Respiratory:  normal breath sounds, clear to auscultation, normal A-P diameter, normal symmetry, normal respiratory excursion, no use of accessory muscles         Cardiac: regular rhythm;normal S1 and S2;no S3 or S4;no murmurs, gallops or rubs detected                pulses full and equal                                        GI:           Extremities and Muscular Skeletal:  no spinal abnormalities noted;normal muscle strength and tone   bilateral LE edema;trace          Neurological:  no gross motor deficits        Psych:  affect appropriate, oriented to time, person and place        CC  No referring provider defined for this encounter.                Thank you for allowing me to participate in the care of your patient.      Sincerely,     Martin Govea MD     [facility]    cc:   No referring provider defined for this encounter.

## 2019-06-14 NOTE — PROGRESS NOTES
Service Date: 06/14/2019      HISTORY OF PRESENT ILLNESS:  Mr. Nraanjo is a very nice 64-year-old gentleman with past medical history significant for an inferior wall myocardial infarction in 2006, treated by stenting of his posterior descending artery.  At that time he was noted to have a 70% stenosis in his left anterior descending artery and a 70% stenosis in his circumflex coronary artery.  When he returned for the staged intervention, the left anterior descending artery improved significantly, was thought to be more in the 30%-50% range.  His mid circumflex stenosis was still in the 70% range; however, the vessel beyond that was quite small, we decided to treat him medically.  Ejection fraction had improved all the way back to 60% with some inferior wall hypokinesia.  He fortunately quit smoking cigarettes at that time, but still smokes an occasional cigar.      Last evaluation of his coronary anatomy was a stress nuclear scan in 2012.  This demonstrated a very small area of completely reversible basal inferior defect consistent with a small amount of ischemia and we decided to treat him medically.  Again, ejection fraction was estimated to be 65%.      Aaron returns to clinic stating he is doing quite well.  He has no chest, arm, neck, jaw or shoulder discomfort.  No dyspnea on exertion, orthopnea or PND.  No palpitations, lightheadedness, dizziness, syncope or near-syncope.      Since I saw him last 2 years ago, he states he has been walking on a regular basis and has been much more intentional about what he eats and has lost 8 pounds.  He states his weight was actually lower, but had a brutal winter and did not do quite as well and gained some of the weight back.      ASSESSMENT AND PLAN:  Aaron appears to be doing quite well from a cardiac standpoint without clinical evidence of ischemia, heart failure or significant arrhythmia.      Blood pressure is low today at 94/60.  He is asymptomatic.  He is fasting  so this is probably artificially low, but we will continue with his atenolol as is.      Fasting lipid profile is the best he has ever had.  Total cholesterol is down from 123, down to 33 points over the last 2 years.  HDL remains low at 33.  LDL is 74, triglycerides are 78, and I pointed out that dramatic improvement steadily over the last 2 years with his walking regimen and more intentional eating and weight loss.  I have encouraged him to continue to do so.        We also again talked about it and decided we are going to proceed with changing from atorvastatin 80 mg to rosuvastatin 40 mg as this will probably be better at raising his HDL.  I have told him to finish up his current atorvastatin and then we will switch him over to rosuvastatin.  After he has been on the rosuvastatin 4-6 weeks, we will recheck a fasting lipid profile.  I have encouraged him to continue with his walking regimen and his intentional diet.  I will have him see an ELAINE in 1 year.  I will see him back in 2 years.  If he should have any problems, I would be glad to see him sooner.      Thank you for allowing me to participate in his care.         VALERIE IRENE MD, [facility]             D: 2019   T: 2019   MT: KENNETH      Name:     BABITA JIMENEZ   MRN:      [id number]        Account:      [id number]   :      1955           Service Date: 2019      Document: [id number]

## 2019-06-14 NOTE — PROGRESS NOTES
HPI and Plan:   See dictation    Orders Placed This Encounter   Procedures     Lipid Profile     Lipid Profile     ALT     Follow-Up with Cardiac Advanced Practice Provider     Follow-Up with Cardiologist       Orders Placed This Encounter   Medications     rosuvastatin (CRESTOR) 40 MG tablet     Sig: Take 1 tablet (40 mg) by mouth daily     Dispense:  90 tablet     Refill:  3       Medications Discontinued During This Encounter   Medication Reason     atorvastatin (LIPITOR) 80 MG tablet          Encounter Diagnoses   Name Primary?     Coronary artery disease involving native coronary artery of native heart without angina pectoris Yes     Mixed hyperlipidemia      HDL deficiency      Peripheral edema      Venous (peripheral) insufficiency        CURRENT MEDICATIONS:  Current Outpatient Medications   Medication Sig Dispense Refill     aspirin 81 MG tablet Take  by mouth daily.       atenolol (TENORMIN) 25 MG tablet Take 1 tablet (25 mg) by mouth daily 90 tablet 0     IBUPROFEN PO Take  by mouth.       Loratadine (CLARITIN PO) Possibly with D       rosuvastatin (CRESTOR) 40 MG tablet Take 1 tablet (40 mg) by mouth daily 90 tablet 3       ALLERGIES   No Known Allergies    PAST MEDICAL HISTORY:  Past Medical History:   Diagnosis Date     Back pain      CAD (coronary artery disease)     cardiac cath 2006: MARIBELL to PDA     Herpes pharyngitis      Hyperlipidemia LDL goal < 70      Impaired glucose tolerance      Myocardial infarction (H) 10/2006    inferior     Peripheral edema      Psoriasis      Tobacco abuse        PAST SURGICAL HISTORY:  Past Surgical History:   Procedure Laterality Date     bilateral cataract extractions      w/ IOL implant     HEART CATH, ANGIOPLASTY  OCt  2006    successful stent to PDa,        FAMILY HISTORY:  Family History   Problem Relation Age of Onset     Other Cancer Mother      Mental Illness Father        SOCIAL HISTORY:  Social History     Socioeconomic History     Marital status:       Spouse name: None     Number of children: None     Years of education: None     Highest education level: None   Occupational History     None   Social Needs     Financial resource strain: None     Food insecurity:     Worry: None     Inability: None     Transportation needs:     Medical: None     Non-medical: None   Tobacco Use     Smoking status: Current Every Day Smoker     Packs/day: 0.50     Types: Cigars     Smokeless tobacco: Never Used     Tobacco comment: 5 cigars a day   Substance and Sexual Activity     Alcohol use: Yes     Drug use: No     Sexual activity: None   Lifestyle     Physical activity:     Days per week: None     Minutes per session: None     Stress: None   Relationships     Social connections:     Talks on phone: None     Gets together: None     Attends Orthodoxy service: None     Active member of club or organization: None     Attends meetings of clubs or organizations: None     Relationship status: None     Intimate partner violence:     Fear of current or ex partner: None     Emotionally abused: None     Physically abused: None     Forced sexual activity: None   Other Topics Concern     Parent/sibling w/ CABG, MI or angioplasty before 65F 55M? Yes      Service Not Asked     Blood Transfusions Not Asked     Caffeine Concern Yes     Comment: 02-03 cups a day     Occupational Exposure Not Asked     Hobby Hazards Not Asked     Sleep Concern No     Stress Concern No     Weight Concern No     Special Diet Yes     Comment: eat healthy      Back Care Not Asked     Exercise Yes     Comment: golf      Bike Helmet Not Asked     Seat Belt Yes     Self-Exams Not Asked   Social History Narrative     None       Review of Systems:  Skin:  Negative       Eyes:  Negative      ENT:  Negative      Respiratory:  Negative       Cardiovascular:  Negative Positive for;edema    Gastroenterology: Negative      Genitourinary:         Musculoskeletal:  Negative      Neurologic:  Negative      Psychiatric:          Heme/Lymph/Imm:  Negative      Endocrine:  Negative        Physical Exam:  Vitals: BP 94/60   Pulse 50   Wt 92.1 kg (203 lb)   SpO2 97%   BMI 27.92 kg/m      Constitutional:  cooperative;in no acute distress overweight      Skin:  warm and dry to the touch, no apparent skin lesions or masses noted          Head:  normocephalic, no masses or lesions        Eyes:  pupils equal and round, conjunctivae and lids unremarkable, sclera white, no xanthalasma, EOMS intact, no nystagmus xanthalasma      Lymph:      ENT:  no pallor or cyanosis, dentition good        Neck:  carotid pulses are full and equal bilaterally;no carotid bruit        Respiratory:  normal breath sounds, clear to auscultation, normal A-P diameter, normal symmetry, normal respiratory excursion, no use of accessory muscles         Cardiac: regular rhythm;normal S1 and S2;no S3 or S4;no murmurs, gallops or rubs detected                pulses full and equal                                        GI:           Extremities and Muscular Skeletal:  no spinal abnormalities noted;normal muscle strength and tone   bilateral LE edema;trace          Neurological:  no gross motor deficits        Psych:  affect appropriate, oriented to time, person and place        CC  No referring provider defined for this encounter.

## 2019-07-15 ENCOUNTER — TELEPHONE (OUTPATIENT)
Dept: CARDIOLOGY | Facility: CLINIC | Age: 64
End: 2019-07-15

## 2019-07-15 DIAGNOSIS — E78.2 MIXED HYPERLIPIDEMIA: Primary | ICD-10-CM

## 2019-07-15 NOTE — TELEPHONE ENCOUNTER
Call from patient's spouse. Patient tried using the crestor 40mg daily for about a month. However, due to muscle aching and stiffness, he has decided he cannot use it any more. He would like to return to lipitor 80mg daily as this worked well for him without side effects.  Will message Dr. Govea to review

## 2019-07-16 RX ORDER — ATORVASTATIN CALCIUM 80 MG/1
80 TABLET, FILM COATED ORAL DAILY
Qty: 90 TABLET | Refills: 3 | Status: SHIPPED | OUTPATIENT
Start: 2019-07-16 | End: 2020-06-05

## 2019-07-16 NOTE — TELEPHONE ENCOUNTER
Spoke with patient to review Dr. Govea's approval to restart lipitor 80mg daily. Rx escripted. Patient prefers not to check labs in August as he is returning to previous dose.

## 2019-07-22 DIAGNOSIS — I25.10 CORONARY ARTERY DISEASE INVOLVING NATIVE CORONARY ARTERY OF NATIVE HEART WITHOUT ANGINA PECTORIS: ICD-10-CM

## 2019-07-22 RX ORDER — ATENOLOL 25 MG/1
25 TABLET ORAL DAILY
Qty: 90 TABLET | Refills: 2 | Status: SHIPPED | OUTPATIENT
Start: 2019-07-22 | End: 2020-04-27

## 2019-08-02 ENCOUNTER — TELEPHONE (OUTPATIENT)
Dept: CARDIOLOGY | Facility: CLINIC | Age: 64
End: 2019-08-02

## 2019-08-02 NOTE — TELEPHONE ENCOUNTER
Chris called to report that changing back to atorvastatin forom Rosuvastatin his muscle and joint aches are totally gone.    Patient has noticed over that past few days some pedal edema., No weight change, denies shortness of breath and states he feels well.    Patient does say he has been drinking a lot of water daily, eating process foods and having many cold meat sandwiches.    Patient will try to reduce water intake, processed food, and salt intake. Patient will try to elevate feet as much as possible. Patient agrees with plan. Patient will call in a few days with update.

## 2019-08-08 ENCOUNTER — TELEPHONE (OUTPATIENT)
Dept: CARDIOLOGY | Facility: CLINIC | Age: 64
End: 2019-08-08

## 2019-08-08 NOTE — TELEPHONE ENCOUNTER
Contacted patient to see if his peripheral edema has improved after restricting sodium foods, water and elevating his feet.  Patient states he has seen a lot of improvement in a week. He states his left foot is still slightly swollen. He will continue to work on his diet and foot elevation. He will call back in a week with an update.

## 2020-04-27 DIAGNOSIS — I25.10 CORONARY ARTERY DISEASE INVOLVING NATIVE CORONARY ARTERY OF NATIVE HEART WITHOUT ANGINA PECTORIS: ICD-10-CM

## 2020-04-27 RX ORDER — ATENOLOL 25 MG/1
25 TABLET ORAL DAILY
Qty: 90 TABLET | Refills: 1 | Status: SHIPPED | OUTPATIENT
Start: 2020-04-27 | End: 2020-06-05

## 2020-06-05 ENCOUNTER — VIRTUAL VISIT (OUTPATIENT)
Dept: CARDIOLOGY | Facility: CLINIC | Age: 65
End: 2020-06-05
Attending: INTERNAL MEDICINE
Payer: COMMERCIAL

## 2020-06-05 DIAGNOSIS — I25.10 CORONARY ARTERY DISEASE INVOLVING NATIVE CORONARY ARTERY OF NATIVE HEART WITHOUT ANGINA PECTORIS: Primary | ICD-10-CM

## 2020-06-05 DIAGNOSIS — E78.2 MIXED HYPERLIPIDEMIA: ICD-10-CM

## 2020-06-05 PROCEDURE — 99213 OFFICE O/P EST LOW 20 MIN: CPT | Mod: GT | Performed by: NURSE PRACTITIONER

## 2020-06-05 RX ORDER — ATENOLOL 25 MG/1
25 TABLET ORAL DAILY
Qty: 90 TABLET | Refills: 3 | Status: SHIPPED | OUTPATIENT
Start: 2020-06-05 | End: 2021-07-20

## 2020-06-05 RX ORDER — ATORVASTATIN CALCIUM 80 MG/1
80 TABLET, FILM COATED ORAL DAILY
Qty: 90 TABLET | Refills: 3 | Status: SHIPPED | OUTPATIENT
Start: 2020-06-05 | End: 2021-06-22

## 2020-06-05 NOTE — PROGRESS NOTES
"Kenn Naranjo is a 65 year old male who is being evaluated via a billable video visit.      The patient has been notified of following:     \"This video visit will be conducted via a call between you and your physician/provider. We have found that certain health care needs can be provided without the need for an in-person physical exam.  This service lets us provide the care you need with a video conversation.  If a prescription is necessary we can send it directly to your pharmacy.  If lab work is needed we can place an order for that and you can then stop by our lab to have the test done at a later time.    Video visits are billed at different rates depending on your insurance coverage.  Please reach out to your insurance provider with any questions.    If during the course of the call the physician/provider feels a video visit is not appropriate, you will not be charged for this service.\"    Patient has given verbal consent for Video visit? Yes    How would you like to obtain your AVS? Mail a copy    Patient would like the video invitation sent by: Text to cell phone: [phone]    Will anyone else be joining your video visit? No   Review Of Systems  Skin: NEGATIVE  Eyes:Ears/Nose/Throat: NEGATIVE  Respiratory: cough relates to allergies and smoking.  Cardiovascular:NEGATIVE  Gastrointestinal: NEGATIVE  Genitourinary:NEGATIVE   Musculoskeletal: NEGATIVE  Neurologic: NEGATIVE  Psychiatric: NEGATIVE  Hematologic/Lymphatic/Immunologic: crestor  Endocrine:  NEGATIVE  Unable to take his VS.  Tea Mckeon LPN        HPI and Plan:   I had the pleasure of seeing Kenn Naranjo today as a video call in annual cardiology clinic follow up. He is a pleasant 65 year old patient of Dr. Govea.    Mr. Naranjo has a history of coronary artery disease with previous inferior wall MI in 2006 with PDA stenting, he has disease in his LAD and circ that has been medically managed. EF 60%. Last stress test in 2012 with a small " reversible basal inferior defect treated medically.    Today Mr. Naranjo is doing well.  He has not had any chest pain, shortness of breath, PND or orthopnea.  Occasionally he gets some leg swelling after he flies but it subsides and he has not had any problems with it really.  He still occasionally smokes a cigar.  He started golfing again, he said yesterday he walked a bunch of hills and feels his age but did not have any chest pain or shortness of breath or concerns about his abilities.  His weight has remained stable.  He has not been able to check his blood pressure, though is never been a problem for him.      Labs Reviewed: Cholesterol 123, HDL 33, LDL 74, triglycerides 78    Physical Exam  Constitutional:   Appears comfortable on room air   Skin:         No facial bleeding, sun burned  Head:       atraumatic  Eyes:       No nystagmus  ENT:       Normal speach  Respiratory:        Appears to have normal respiratory excursion via video  Neurological:       Alert and orientated x 3  Psych:     Normal affect    Assessment and Plan  1.  Coronary artery disease.  He has stable coronary artery disease dating back to 2006 when he had a inferior STEMI.  He is not having any ischemic symptoms.  He continues to exercise and maintain his weight.  He is on beta-blocker and statin therapy.  I refilled all of his medications.  Next year he should have labs drawn in clinic prior to his visit with Dr. Calhoun.      Thank you for allowing me to care for Kenn Naranjo today.    DORINDA Oneal, CNP  Cardiology    Voice recognition software was used for this note, I have reviewed this note, but errors may have been missed.    No orders of the defined types were placed in this encounter.    No orders of the defined types were placed in this encounter.    There are no discontinued medications.      CURRENT MEDICATIONS:  Current Outpatient Medications   Medication Sig Dispense Refill     aspirin 81 MG tablet Take  by mouth  daily.       atenolol (TENORMIN) 25 MG tablet Take 1 tablet (25 mg) by mouth daily 90 tablet 1     atorvastatin (LIPITOR) 80 MG tablet Take 1 tablet (80 mg) by mouth daily 90 tablet 3     IBUPROFEN PO Take  by mouth.       Loratadine (CLARITIN PO) Possibly with D         ALLERGIES     Allergies   Allergen Reactions     Crestor [Rosuvastatin] Muscle Pain (Myalgia)       PAST MEDICAL HISTORY:  Past Medical History:   Diagnosis Date     Back pain      CAD (coronary artery disease)     cardiac cath 2006: MARIBELL to PDA     Herpes pharyngitis      Hyperlipidemia LDL goal < 70      Impaired glucose tolerance      Myocardial infarction (H) 10/2006    inferior     Peripheral edema      Psoriasis      Tobacco abuse        PAST SURGICAL HISTORY:  Past Surgical History:   Procedure Laterality Date     bilateral cataract extractions      w/ IOL implant     HEART CATH, ANGIOPLASTY  OCt  2006    successful stent to PDa,        FAMILY HISTORY:  Family History   Problem Relation Age of Onset     Other Cancer Mother      Mental Illness Father        SOCIAL HISTORY:  Social History     Socioeconomic History     Marital status:      Spouse name: None     Number of children: None     Years of education: None     Highest education level: None   Occupational History     None   Social Needs     Financial resource strain: None     Food insecurity     Worry: None     Inability: None     Transportation needs     Medical: None     Non-medical: None   Tobacco Use     Smoking status: Current Every Day Smoker     Packs/day: 0.50     Types: Cigars     Smokeless tobacco: Never Used     Tobacco comment: 5 cigars a day   Substance and Sexual Activity     Alcohol use: Yes     Drug use: No     Sexual activity: None   Lifestyle     Physical activity     Days per week: None     Minutes per session: None     Stress: None   Relationships     Social connections     Talks on phone: None     Gets together: None     Attends Amish service: None      Active member of club or organization: None     Attends meetings of clubs or organizations: None     Relationship status: None     Intimate partner violence     Fear of current or ex partner: None     Emotionally abused: None     Physically abused: None     Forced sexual activity: None   Other Topics Concern     Parent/sibling w/ CABG, MI or angioplasty before 65F 55M? Yes      Service Not Asked     Blood Transfusions Not Asked     Caffeine Concern Yes     Comment: 02-03 cups a day     Occupational Exposure Not Asked     Hobby Hazards Not Asked     Sleep Concern No     Stress Concern No     Weight Concern No     Special Diet Yes     Comment: eat healthy      Back Care Not Asked     Exercise Yes     Comment: golf      Bike Helmet Not Asked     Seat Belt Yes     Self-Exams Not Asked   Social History Narrative     None       Encounter Diagnoses   Name Primary?     Coronary artery disease involving native coronary artery of native heart without angina pectoris Yes     Mixed hyperlipidemia        Recent Lab Results:  LIPID RESULTS:  Lab Results   Component Value Date    CHOL 123 06/14/2019    HDL 33 (L) 06/14/2019    LDL 74 06/14/2019    TRIG 78 06/14/2019    CHOLHDLRATIO 3.4 04/20/2015       LIVER ENZYME RESULTS:  Lab Results   Component Value Date    AST 25 05/04/2006    ALT <5 (L) 11/20/2014       CBC RESULTS:  Lab Results   Component Value Date    WBC 10.1 06/15/2013    RBC 5.17 06/15/2013    HGB 16.0 06/15/2013    HCT 46.1 06/15/2013    MCV 89 06/15/2013    MCH 30.9 06/15/2013    MCHC 34.7 06/15/2013    RDW 13.1 06/15/2013     06/15/2013       BMP RESULTS:  Lab Results   Component Value Date     06/15/2013    POTASSIUM 4.0 06/15/2013    CHLORIDE 104 06/15/2013    CO2 22 06/15/2013    ANIONGAP 10 06/15/2013     (H) 06/15/2013    BUN 16 06/15/2013    CR 0.83 06/15/2013    GFRESTIMATED >90 06/15/2013    GFRESTBLACK >90 06/15/2013    CHIP 9.5 06/15/2013        A1C RESULTS:  No results found  for: A1C    INR RESULTS:  Lab Results   Component Value Date    INR 0.96 11/20/2006    INR 0.99 10/31/2006           CC  Martin Govea MD  [address]  [place] [address]        Video-Visit Details    Type of service:  Video Visit    Video Start Time: 7:45  Video End Time: 7:53 AM    Originating Location (pt. Location): Home    Distant Location (provider location):  [facility]     Platform used for Video Visit: DORINDA Jain CNP

## 2020-06-05 NOTE — LETTER
"6/5/2020    Physician No Ref-Primary  No address on file    RE: Kenn Naranjo       Dear Colleague,    I had the pleasure of seeing Kenn Naranjo in the [facility].    Kenn Naranjo is a 65 year old male who is being evaluated via a billable video visit.      The patient has been notified of following:     \"This video visit will be conducted via a call between you and your physician/provider. We have found that certain health care needs can be provided without the need for an in-person physical exam.  This service lets us provide the care you need with a video conversation.  If a prescription is necessary we can send it directly to your pharmacy.  If lab work is needed we can place an order for that and you can then stop by our lab to have the test done at a later time.    Video visits are billed at different rates depending on your insurance coverage.  Please reach out to your insurance provider with any questions.    If during the course of the call the physician/provider feels a video visit is not appropriate, you will not be charged for this service.\"    Patient has given verbal consent for Video visit? Yes    How would you like to obtain your AVS? Mail a copy    Patient would like the video invitation sent by: Text to cell phone: [phone]    Will anyone else be joining your video visit? No   Review Of Systems  Skin: NEGATIVE  Eyes:Ears/Nose/Throat: NEGATIVE  Respiratory: cough relates to allergies and smoking.  Cardiovascular:NEGATIVE  Gastrointestinal: NEGATIVE  Genitourinary:NEGATIVE   Musculoskeletal: NEGATIVE  Neurologic: NEGATIVE  Psychiatric: NEGATIVE  Hematologic/Lymphatic/Immunologic: crestor  Endocrine:  NEGATIVE  Unable to take his VS.  Tea Mckeon LPN        HPI and Plan:   I had the pleasure of seeing Kenn Naranjo today as a video call in annual cardiology clinic follow up. He is a pleasant 65 year old patient of Dr. Govea.    Mr. Naranjo has a history of " coronary artery disease with previous inferior wall MI in 2006 with PDA stenting, he has disease in his LAD and circ that has been medically managed. EF 60%. Last stress test in 2012 with a small reversible basal inferior defect treated medically.    Today Mr. Naranjo is doing well.  He has not had any chest pain, shortness of breath, PND or orthopnea.  Occasionally he gets some leg swelling after he flies but it subsides and he has not had any problems with it really.  He still occasionally smokes a cigar.  He started golfing again, he said yesterday he walked a bunch of hills and feels his age but did not have any chest pain or shortness of breath or concerns about his abilities.  His weight has remained stable.  He has not been able to check his blood pressure, though is never been a problem for him.      Labs Reviewed: Cholesterol 123, HDL 33, LDL 74, triglycerides 78    Physical Exam  Constitutional:   Appears comfortable on room air   Skin:         No facial bleeding, sun burned  Head:       atraumatic  Eyes:       No nystagmus  ENT:       Normal speach  Respiratory:        Appears to have normal respiratory excursion via video  Neurological:       Alert and orientated x 3  Psych:     Normal affect    Assessment and Plan  1.  Coronary artery disease.  He has stable coronary artery disease dating back to 2006 when he had a inferior STEMI.  He is not having any ischemic symptoms.  He continues to exercise and maintain his weight.  He is on beta-blocker and statin therapy.  I refilled all of his medications.  Next year he should have labs drawn in clinic prior to his visit with Dr. Calhoun.      Thank you for allowing me to care for Kenn Naranjo today.    DORINDA Oneal, CNP  Cardiology    Voice recognition software was used for this note, I have reviewed this note, but errors may have been missed.    No orders of the defined types were placed in this encounter.    No orders of the defined types were  placed in this encounter.    There are no discontinued medications.      CURRENT MEDICATIONS:  Current Outpatient Medications   Medication Sig Dispense Refill     aspirin 81 MG tablet Take  by mouth daily.       atenolol (TENORMIN) 25 MG tablet Take 1 tablet (25 mg) by mouth daily 90 tablet 1     atorvastatin (LIPITOR) 80 MG tablet Take 1 tablet (80 mg) by mouth daily 90 tablet 3     IBUPROFEN PO Take  by mouth.       Loratadine (CLARITIN PO) Possibly with D         ALLERGIES     Allergies   Allergen Reactions     Crestor [Rosuvastatin] Muscle Pain (Myalgia)       PAST MEDICAL HISTORY:  Past Medical History:   Diagnosis Date     Back pain      CAD (coronary artery disease)     cardiac cath 2006: MARIBELL to PDA     Herpes pharyngitis      Hyperlipidemia LDL goal < 70      Impaired glucose tolerance      Myocardial infarction (H) 10/2006    inferior     Peripheral edema      Psoriasis      Tobacco abuse        PAST SURGICAL HISTORY:  Past Surgical History:   Procedure Laterality Date     bilateral cataract extractions      w/ IOL implant     HEART CATH, ANGIOPLASTY  OCt  2006    successful stent to PDa,        FAMILY HISTORY:  Family History   Problem Relation Age of Onset     Other Cancer Mother      Mental Illness Father        SOCIAL HISTORY:  Social History     Socioeconomic History     Marital status:      Spouse name: None     Number of children: None     Years of education: None     Highest education level: None   Occupational History     None   Social Needs     Financial resource strain: None     Food insecurity     Worry: None     Inability: None     Transportation needs     Medical: None     Non-medical: None   Tobacco Use     Smoking status: Current Every Day Smoker     Packs/day: 0.50     Types: Cigars     Smokeless tobacco: Never Used     Tobacco comment: 5 cigars a day   Substance and Sexual Activity     Alcohol use: Yes     Drug use: No     Sexual activity: None   Lifestyle     Physical activity      Days per week: None     Minutes per session: None     Stress: None   Relationships     Social connections     Talks on phone: None     Gets together: None     Attends Scientologist service: None     Active member of club or organization: None     Attends meetings of clubs or organizations: None     Relationship status: None     Intimate partner violence     Fear of current or ex partner: None     Emotionally abused: None     Physically abused: None     Forced sexual activity: None   Other Topics Concern     Parent/sibling w/ CABG, MI or angioplasty before 65F 55M? Yes      Service Not Asked     Blood Transfusions Not Asked     Caffeine Concern Yes     Comment: 02-03 cups a day     Occupational Exposure Not Asked     Hobby Hazards Not Asked     Sleep Concern No     Stress Concern No     Weight Concern No     Special Diet Yes     Comment: eat healthy      Back Care Not Asked     Exercise Yes     Comment: golf      Bike Helmet Not Asked     Seat Belt Yes     Self-Exams Not Asked   Social History Narrative     None       Encounter Diagnoses   Name Primary?     Coronary artery disease involving native coronary artery of native heart without angina pectoris Yes     Mixed hyperlipidemia        Recent Lab Results:  LIPID RESULTS:  Lab Results   Component Value Date    CHOL 123 06/14/2019    HDL 33 (L) 06/14/2019    LDL 74 06/14/2019    TRIG 78 06/14/2019    CHOLHDLRATIO 3.4 04/20/2015       LIVER ENZYME RESULTS:  Lab Results   Component Value Date    AST 25 05/04/2006    ALT <5 (L) 11/20/2014       CBC RESULTS:  Lab Results   Component Value Date    WBC 10.1 06/15/2013    RBC 5.17 06/15/2013    HGB 16.0 06/15/2013    HCT 46.1 06/15/2013    MCV 89 06/15/2013    MCH 30.9 06/15/2013    MCHC 34.7 06/15/2013    RDW 13.1 06/15/2013     06/15/2013       BMP RESULTS:  Lab Results   Component Value Date     06/15/2013    POTASSIUM 4.0 06/15/2013    CHLORIDE 104 06/15/2013    CO2 22 06/15/2013    ANIONGAP 10  06/15/2013     (H) 06/15/2013    BUN 16 06/15/2013    CR 0.83 06/15/2013    GFRESTIMATED >90 06/15/2013    GFRESTBLACK >90 06/15/2013    CHIP 9.5 06/15/2013        A1C RESULTS:  No results found for: A1C    INR RESULTS:  Lab Results   Component Value Date    INR 0.96 11/20/2006    INR 0.99 10/31/2006           CC  Martin Govea MD  [address] [address]        Video-Visit Details    Type of service:  Video Visit    Video Start Time: 7:45  Video End Time: 7:53 AM    Originating Location (pt. Location): Home    Distant Location (provider location):  [facility]     Platform used for Video Visit: DoximDORINDA Pugh CNP        Thank you for allowing me to participate in the care of your patient.      Sincerely,     DORINDA Little CNP     [facility]    cc:   Martin Govea MD  [address] [address]

## 2021-02-03 DIAGNOSIS — I25.10 CORONARY ARTERY DISEASE INVOLVING NATIVE CORONARY ARTERY OF NATIVE HEART WITHOUT ANGINA PECTORIS: Primary | ICD-10-CM

## 2021-06-22 DIAGNOSIS — E78.2 MIXED HYPERLIPIDEMIA: ICD-10-CM

## 2021-06-22 RX ORDER — ATORVASTATIN CALCIUM 80 MG/1
80 TABLET, FILM COATED ORAL DAILY
Qty: 90 TABLET | Refills: 0 | Status: SHIPPED | OUTPATIENT
Start: 2021-06-22 | End: 2021-09-17

## 2021-07-08 ENCOUNTER — PRE VISIT (OUTPATIENT)
Dept: CARDIOLOGY | Facility: CLINIC | Age: 66
End: 2021-07-08

## 2021-07-13 ENCOUNTER — LAB (OUTPATIENT)
Dept: LAB | Facility: CLINIC | Age: 66
End: 2021-07-13
Payer: COMMERCIAL

## 2021-07-13 DIAGNOSIS — I25.10 CORONARY ARTERY DISEASE INVOLVING NATIVE CORONARY ARTERY OF NATIVE HEART WITHOUT ANGINA PECTORIS: ICD-10-CM

## 2021-07-13 DIAGNOSIS — I25.10 CORONARY ARTERY DISEASE INVOLVING NATIVE CORONARY ARTERY OF NATIVE HEART WITHOUT ANGINA PECTORIS: Primary | ICD-10-CM

## 2021-07-13 LAB
CHOLEST SERPL-MCNC: 142 MG/DL
FASTING STATUS PATIENT QL REPORTED: YES
HDLC SERPL-MCNC: 40 MG/DL
LDLC SERPL CALC-MCNC: 82 MG/DL
NONHDLC SERPL-MCNC: 102 MG/DL
TRIGL SERPL-MCNC: 99 MG/DL

## 2021-07-13 PROCEDURE — 80061 LIPID PANEL: CPT | Performed by: INTERNAL MEDICINE

## 2021-07-13 PROCEDURE — 36415 COLL VENOUS BLD VENIPUNCTURE: CPT | Performed by: INTERNAL MEDICINE

## 2021-07-13 PROCEDURE — 80048 BASIC METABOLIC PNL TOTAL CA: CPT | Performed by: INTERNAL MEDICINE

## 2021-07-14 ENCOUNTER — OFFICE VISIT (OUTPATIENT)
Dept: CARDIOLOGY | Facility: CLINIC | Age: 66
End: 2021-07-14
Payer: COMMERCIAL

## 2021-07-14 VITALS
SYSTOLIC BLOOD PRESSURE: 110 MMHG | BODY MASS INDEX: 28.24 KG/M2 | HEART RATE: 62 BPM | OXYGEN SATURATION: 94 % | HEIGHT: 72 IN | DIASTOLIC BLOOD PRESSURE: 68 MMHG | WEIGHT: 208.5 LBS

## 2021-07-14 DIAGNOSIS — E78.2 MIXED HYPERLIPIDEMIA: ICD-10-CM

## 2021-07-14 DIAGNOSIS — I87.2 VENOUS (PERIPHERAL) INSUFFICIENCY: Chronic | ICD-10-CM

## 2021-07-14 DIAGNOSIS — R60.0 PERIPHERAL EDEMA: ICD-10-CM

## 2021-07-14 DIAGNOSIS — R73.02 IMPAIRED GLUCOSE TOLERANCE: ICD-10-CM

## 2021-07-14 DIAGNOSIS — Z72.0 TOBACCO ABUSE: ICD-10-CM

## 2021-07-14 DIAGNOSIS — I25.10 CORONARY ARTERY DISEASE INVOLVING NATIVE CORONARY ARTERY OF NATIVE HEART WITHOUT ANGINA PECTORIS: Primary | ICD-10-CM

## 2021-07-14 DIAGNOSIS — E78.6 HDL DEFICIENCY: Chronic | ICD-10-CM

## 2021-07-14 LAB
ANION GAP SERPL CALCULATED.3IONS-SCNC: 5 MMOL/L (ref 3–14)
BUN SERPL-MCNC: 13 MG/DL (ref 7–30)
CALCIUM SERPL-MCNC: 9.2 MG/DL (ref 8.5–10.1)
CHLORIDE BLD-SCNC: 110 MMOL/L (ref 94–109)
CO2 SERPL-SCNC: 22 MMOL/L (ref 20–32)
CREAT SERPL-MCNC: 0.85 MG/DL (ref 0.66–1.25)
GFR SERPL CREATININE-BSD FRML MDRD: >90 ML/MIN/1.73M2
GLUCOSE BLD-MCNC: 101 MG/DL (ref 70–99)
POTASSIUM BLD-SCNC: 4.6 MMOL/L (ref 3.4–5.3)
SODIUM SERPL-SCNC: 137 MMOL/L (ref 133–144)

## 2021-07-14 PROCEDURE — 99214 OFFICE O/P EST MOD 30 MIN: CPT | Performed by: INTERNAL MEDICINE

## 2021-07-14 ASSESSMENT — MIFFLIN-ST. JEOR: SCORE: 1755.81

## 2021-07-14 NOTE — PROGRESS NOTES
Service Date: 07/14/2021    HISTORY OF PRESENT ILLNESS:  Mr. Naranjo is a very nice 66-year-old gentleman with past medical history significant for an inferior wall myocardial infarction in 2006, treated by stenting of his posterior descending artery.  At that time, he was noted to have a 70% stenosis in his left anterior descending artery and a 70% stenosis in his circumflex coronary artery.  When he returned for a staged intervention of his left anterior descending artery.  The stenosis had improved significantly and was now in the 30%-50% range.  His mid circumflex stenosis was still 70%; however, the vessel beyond was quite small and we decided to treat him medically.  Ejection fraction improved all the way back to 60% with some inferior wall hypokinesia.  He fortunately quit smoking at that time, but still smokes an occasional cigar.    Last evaluation of his coronary anatomy was a stress nuclear scan in 2012 demonstrating a very small basilar inferior defect with minimal ischemia.  Ejection fraction was 65%.    Aaron returns to clinic stating he thinks he is doing great.  He has no chest, arm, neck, jaw or shoulder discomfort.  He states he walks every day, minimum of a mile, but yesterday he states he did 6 miles.  He has no symptoms with this.  He still golfs on a regular basis without any problems.  He unfortunately states he is stil smoking an occasional cigar, but planning on giving it up by the end of the summertime.  He notes no side effects or problems with his current medical regimen.    ASSESSMENT AND PLAN:  Aaron appears to be doing quite well from a cardiac standpoint without clinical evidence of ischemia, heart failure or significant arrhythmia.    Blood pressure is very well-controlled at this time at 110/68 with a pulse of 62.    Weight is 208 pounds.  It is up a bit.  He had lost weight and was running 201-203 pounds.  He states he is planning on getting the weight back off again.  Body mass  index is 28.7 with clothes on.    Fasting lipid profile shows a total cholesterol of 142, HDL is 40, LDL is 82, triglycerides are 99.  We talked about the fact that his HDL is up.  When I saw him last, we talked about switching from atorvastatin to rosuvastatin, although he states he still thinks he is on the atorvastatin 80.  However, his HDL has improved significantly.  This may be due to his exercise regimen.  LDL is up a bit at 82.  This may be due to his weight gain.    He asks about metabolic syndrome and hypertriglyceridemia.  His glucoses in the past have been borderline high to mildly high.  We talked about the fact that this could be carbohydrates in his diet; lack of exercise and weight gain will all contribute to this; also diabetes, thyroid problems, both of which his wife has and/or nephrotic syndrome.  He states he at times will drink a fair amount of pop and this may cause his sugars to go up.    I have admonished him to come off of the cigars altogether.    I will have him follow up with my ELAINE in 1 year.  I will see him back in 2 years.  If he should have any problems, I would be glad to see him sooner.    Thank you for allowing me to participate in his care.    Martin Govea MD, [facility]        D: 2021   T: 2021   MT: al    Name:     BABITA JIMENEZ  MRN:      [id number]        Account:      [id number]   :      1955           Service Date: 2021       Document: [id number]

## 2021-07-14 NOTE — PROGRESS NOTES
HPI and Plan:   See dictation    Orders Placed This Encounter   Procedures     Basic metabolic panel     Lipid Profile     Follow-Up with Cardiac Advanced Practice Provider     Follow-Up with Cardiologist       No orders of the defined types were placed in this encounter.      There are no discontinued medications.      Encounter Diagnoses   Name Primary?     Coronary artery disease involving native coronary artery of native heart without angina pectoris Yes     Mixed hyperlipidemia      HDL deficiency      Venous (peripheral) insufficiency      Peripheral edema      Impaired glucose tolerance      Tobacco abuse        CURRENT MEDICATIONS:  Current Outpatient Medications   Medication Sig Dispense Refill     aspirin 81 MG tablet Take  by mouth daily.       atenolol (TENORMIN) 25 MG tablet Take 1 tablet (25 mg) by mouth daily 90 tablet 3     atorvastatin (LIPITOR) 80 MG tablet Take 1 tablet (80 mg) by mouth daily 90 tablet 0     IBUPROFEN PO Take  by mouth.       Loratadine (CLARITIN PO) Possibly with D         ALLERGIES     Allergies   Allergen Reactions     Crestor [Rosuvastatin] Muscle Pain (Myalgia)       PAST MEDICAL HISTORY:  Past Medical History:   Diagnosis Date     Back pain      CAD (coronary artery disease)     cardiac cath 2006: MARIBELL to PDA     Herpes pharyngitis      Hyperlipidemia LDL goal < 70      Impaired glucose tolerance      Myocardial infarction (H) 10/2006    inferior     Peripheral edema      Psoriasis      Tobacco abuse        PAST SURGICAL HISTORY:  Past Surgical History:   Procedure Laterality Date     bilateral cataract extractions      w/ IOL implant     HEART CATH, ANGIOPLASTY  OCt  2006    successful stent to PDa,        FAMILY HISTORY:  Family History   Problem Relation Age of Onset     Other Cancer Mother      Mental Illness Father        SOCIAL HISTORY:  Social History     Socioeconomic History     Marital status:      Spouse name: None     Number of children: None     Years of  education: None     Highest education level: None   Occupational History     None   Tobacco Use     Smoking status: Current Every Day Smoker     Types: Cigars     Smokeless tobacco: Never Used     Tobacco comment: 5 cigars a day   Substance and Sexual Activity     Alcohol use: Yes     Drug use: No     Sexual activity: None   Other Topics Concern     Parent/sibling w/ CABG, MI or angioplasty before 65F 55M? Yes      Service Not Asked     Blood Transfusions Not Asked     Caffeine Concern Yes     Comment: 02-03 cups a day     Occupational Exposure Not Asked     Hobby Hazards Not Asked     Sleep Concern No     Stress Concern No     Weight Concern No     Special Diet Yes     Comment: eat healthy      Back Care Not Asked     Exercise Yes     Comment: golf      Bike Helmet Not Asked     Seat Belt Yes     Self-Exams Not Asked   Social History Narrative     None     Social Determinants of Health     Financial Resource Strain:      Difficulty of Paying Living Expenses:    Food Insecurity:      Worried About Running Out of Food in the Last Year:      Ran Out of Food in the Last Year:    Transportation Needs:      Lack of Transportation (Medical):      Lack of Transportation (Non-Medical):    Physical Activity:      Days of Exercise per Week:      Minutes of Exercise per Session:    Stress:      Feeling of Stress :    Social Connections:      Frequency of Communication with Friends and Family:      Frequency of Social Gatherings with Friends and Family:      Attends Baptism Services:      Active Member of Clubs or Organizations:      Attends Club or Organization Meetings:      Marital Status:    Intimate Partner Violence:      Fear of Current or Ex-Partner:      Emotionally Abused:      Physically Abused:      Sexually Abused:        Review of Systems:  Skin:  Negative       Eyes:  Negative      ENT:  Negative      Respiratory:  Negative       Cardiovascular:    edema;Positive for occ.  Gastroenterology: Negative     "  Genitourinary:  Negative      Musculoskeletal:  Negative      Neurologic:  Negative      Psychiatric:  Negative      Heme/Lymph/Imm:  Positive for allergies seasonal  Endocrine:  Negative        Physical Exam:  Vitals: /68   Pulse 62   Ht 1.816 m (5' 11.5\")   Wt 94.6 kg (208 lb 8 oz)   SpO2 94%   BMI 28.67 kg/m      Constitutional:  cooperative, alert and oriented, well developed, well nourished, in no acute distress overweight      Skin:  warm and dry to the touch, no apparent skin lesions or masses noted          Head:  normocephalic, no masses or lesions        Eyes:  pupils equal and round;conjunctivae and lids unremarkable xanthalasma      Lymph:      ENT:  no pallor or cyanosis, dentition good        Neck:  carotid pulses are full and equal bilaterally;no carotid bruit        Respiratory:  normal breath sounds, clear to auscultation, normal A-P diameter, normal symmetry, normal respiratory excursion, no use of accessory muscles         Cardiac: regular rhythm;normal S1 and S2;no S3 or S4;no murmurs, gallops or rubs detected                pulses full and equal                                        GI:           Extremities and Muscular Skeletal:  no spinal abnormalities noted;normal muscle strength and tone   bilateral LE edema;trace          Neurological:  no gross motor deficits        Psych:  affect appropriate, oriented to time, person and place        CC  No referring provider defined for this encounter.              "

## 2021-07-14 NOTE — LETTER
7/14/2021    Physician No Ref-Primary  No address on file    RE: Kenn Naranjo       Dear Colleague,    I had the pleasure of seeing Kenn Naranjo in the [facility].    HPI and Plan:   See dictation    Orders Placed This Encounter   Procedures     Basic metabolic panel     Lipid Profile     Follow-Up with Cardiac Advanced Practice Provider     Follow-Up with Cardiologist       No orders of the defined types were placed in this encounter.      There are no discontinued medications.      Encounter Diagnoses   Name Primary?     Coronary artery disease involving native coronary artery of native heart without angina pectoris Yes     Mixed hyperlipidemia      HDL deficiency      Venous (peripheral) insufficiency      Peripheral edema      Impaired glucose tolerance      Tobacco abuse        CURRENT MEDICATIONS:  Current Outpatient Medications   Medication Sig Dispense Refill     aspirin 81 MG tablet Take  by mouth daily.       atenolol (TENORMIN) 25 MG tablet Take 1 tablet (25 mg) by mouth daily 90 tablet 3     atorvastatin (LIPITOR) 80 MG tablet Take 1 tablet (80 mg) by mouth daily 90 tablet 0     IBUPROFEN PO Take  by mouth.       Loratadine (CLARITIN PO) Possibly with D         ALLERGIES     Allergies   Allergen Reactions     Crestor [Rosuvastatin] Muscle Pain (Myalgia)       PAST MEDICAL HISTORY:  Past Medical History:   Diagnosis Date     Back pain      CAD (coronary artery disease)     cardiac cath 2006: MARIBELL to PDA     Herpes pharyngitis      Hyperlipidemia LDL goal < 70      Impaired glucose tolerance      Myocardial infarction (H) 10/2006    inferior     Peripheral edema      Psoriasis      Tobacco abuse        PAST SURGICAL HISTORY:  Past Surgical History:   Procedure Laterality Date     bilateral cataract extractions      w/ IOL implant     HEART CATH, ANGIOPLASTY  OCt  2006    successful stent to PDa,        FAMILY HISTORY:  Family History   Problem  Relation Age of Onset     Other Cancer Mother      Mental Illness Father        SOCIAL HISTORY:  Social History     Socioeconomic History     Marital status:      Spouse name: None     Number of children: None     Years of education: None     Highest education level: None   Occupational History     None   Tobacco Use     Smoking status: Current Every Day Smoker     Types: Cigars     Smokeless tobacco: Never Used     Tobacco comment: 5 cigars a day   Substance and Sexual Activity     Alcohol use: Yes     Drug use: No     Sexual activity: None   Other Topics Concern     Parent/sibling w/ CABG, MI or angioplasty before 65F 55M? Yes      Service Not Asked     Blood Transfusions Not Asked     Caffeine Concern Yes     Comment: 02-03 cups a day     Occupational Exposure Not Asked     Hobby Hazards Not Asked     Sleep Concern No     Stress Concern No     Weight Concern No     Special Diet Yes     Comment: eat healthy      Back Care Not Asked     Exercise Yes     Comment: golf      Bike Helmet Not Asked     Seat Belt Yes     Self-Exams Not Asked   Social History Narrative     None     Social Determinants of Health     Financial Resource Strain:      Difficulty of Paying Living Expenses:    Food Insecurity:      Worried About Running Out of Food in the Last Year:      Ran Out of Food in the Last Year:    Transportation Needs:      Lack of Transportation (Medical):      Lack of Transportation (Non-Medical):    Physical Activity:      Days of Exercise per Week:      Minutes of Exercise per Session:    Stress:      Feeling of Stress :    Social Connections:      Frequency of Communication with Friends and Family:      Frequency of Social Gatherings with Friends and Family:      Attends Nondenominational Services:      Active Member of Clubs or Organizations:      Attends Club or Organization Meetings:      Marital Status:    Intimate Partner Violence:      Fear of Current or Ex-Partner:      Emotionally Abused:       "Physically Abused:      Sexually Abused:        Review of Systems:  Skin:  Negative       Eyes:  Negative      ENT:  Negative      Respiratory:  Negative       Cardiovascular:    edema;Positive for occ.  Gastroenterology: Negative      Genitourinary:  Negative      Musculoskeletal:  Negative      Neurologic:  Negative      Psychiatric:  Negative      Heme/Lymph/Imm:  Positive for allergies seasonal  Endocrine:  Negative        Physical Exam:  Vitals: /68   Pulse 62   Ht 1.816 m (5' 11.5\")   Wt 94.6 kg (208 lb 8 oz)   SpO2 94%   BMI 28.67 kg/m      Constitutional:  cooperative, alert and oriented, well developed, well nourished, in no acute distress overweight      Skin:  warm and dry to the touch, no apparent skin lesions or masses noted          Head:  normocephalic, no masses or lesions        Eyes:  pupils equal and round;conjunctivae and lids unremarkable xanthalasma      Lymph:      ENT:  no pallor or cyanosis, dentition good        Neck:  carotid pulses are full and equal bilaterally;no carotid bruit        Respiratory:  normal breath sounds, clear to auscultation, normal A-P diameter, normal symmetry, normal respiratory excursion, no use of accessory muscles         Cardiac: regular rhythm;normal S1 and S2;no S3 or S4;no murmurs, gallops or rubs detected                pulses full and equal                                        GI:           Extremities and Muscular Skeletal:  no spinal abnormalities noted;normal muscle strength and tone   bilateral LE edema;trace          Neurological:  no gross motor deficits        Psych:  affect appropriate, oriented to time, person and place        CC  No referring provider defined for this encounter.                  Thank you for allowing me to participate in the care of your patient.      Sincerely,     Martin Govea MD     [facility]  cc:   No referring provider defined for this " encounter.

## 2021-07-20 DIAGNOSIS — I25.10 CORONARY ARTERY DISEASE INVOLVING NATIVE CORONARY ARTERY OF NATIVE HEART WITHOUT ANGINA PECTORIS: ICD-10-CM

## 2021-07-20 RX ORDER — ATENOLOL 25 MG/1
25 TABLET ORAL DAILY
Qty: 90 TABLET | Refills: 3 | Status: SHIPPED | OUTPATIENT
Start: 2021-07-20 | End: 2022-07-20

## 2021-09-17 DIAGNOSIS — E78.2 MIXED HYPERLIPIDEMIA: ICD-10-CM

## 2021-09-17 RX ORDER — ATORVASTATIN CALCIUM 80 MG/1
80 TABLET, FILM COATED ORAL DAILY
Qty: 90 TABLET | Refills: 3 | Status: SHIPPED | OUTPATIENT
Start: 2021-09-17 | End: 2022-07-20

## 2022-07-20 DIAGNOSIS — E78.2 MIXED HYPERLIPIDEMIA: ICD-10-CM

## 2022-07-20 DIAGNOSIS — I25.10 CORONARY ARTERY DISEASE INVOLVING NATIVE CORONARY ARTERY OF NATIVE HEART WITHOUT ANGINA PECTORIS: ICD-10-CM

## 2022-07-20 RX ORDER — ATORVASTATIN CALCIUM 80 MG/1
80 TABLET, FILM COATED ORAL DAILY
Qty: 90 TABLET | Refills: 1 | Status: SHIPPED | OUTPATIENT
Start: 2022-07-20 | End: 2023-03-23

## 2022-07-20 RX ORDER — ATENOLOL 25 MG/1
25 TABLET ORAL DAILY
Qty: 90 TABLET | Refills: 1 | Status: SHIPPED | OUTPATIENT
Start: 2022-07-20 | End: 2023-01-12

## 2022-07-20 NOTE — TELEPHONE ENCOUNTER
Received refill request for:  Atenolol, Atorvastatin  Last OV was: 7/14/21 Dr. Govea  Labs/EKG: BMP/Lipids 7/13/21  Future Appointments   Date Time Provider Department Center   11/29/2022  4:15 PM Martin Govea MD [facility]     New script sent to: [facility]

## 2022-08-19 ENCOUNTER — TELEPHONE (OUTPATIENT)
Dept: CARDIOLOGY | Facility: CLINIC | Age: 67
End: 2022-08-19

## 2022-08-19 DIAGNOSIS — E78.6 HDL DEFICIENCY: Primary | ICD-10-CM

## 2022-08-19 DIAGNOSIS — I25.10 CORONARY ARTERY DISEASE INVOLVING NATIVE CORONARY ARTERY OF NATIVE HEART WITHOUT ANGINA PECTORIS: ICD-10-CM

## 2022-08-19 NOTE — TELEPHONE ENCOUNTER
Health Call Center    Phone Message    May a detailed message be left on voicemail: yes     Reason for Call: Order(s): Other:   Reason for requested: Patient is scheduled for a f/u appt with Dr Govea on 11/29. I scheduled a lab appt on 11/28 at his request. Please place lab order, he will come fasting.    Date needed: November    Provider name: Dr Govea      Action Taken: Other: Cardiology    Travel Screening: Not Applicable

## 2022-11-28 ENCOUNTER — LAB (OUTPATIENT)
Dept: LAB | Facility: CLINIC | Age: 67
End: 2022-11-28
Payer: COMMERCIAL

## 2022-11-28 DIAGNOSIS — E78.6 HDL DEFICIENCY: ICD-10-CM

## 2022-11-28 DIAGNOSIS — I25.10 CORONARY ARTERY DISEASE INVOLVING NATIVE CORONARY ARTERY OF NATIVE HEART WITHOUT ANGINA PECTORIS: ICD-10-CM

## 2022-11-28 LAB
ALT SERPL W P-5'-P-CCNC: 26 U/L (ref 0–70)
ANION GAP SERPL CALCULATED.3IONS-SCNC: 7 MMOL/L (ref 3–14)
BUN SERPL-MCNC: 15 MG/DL (ref 7–30)
CALCIUM SERPL-MCNC: 9.5 MG/DL (ref 8.5–10.1)
CHLORIDE BLD-SCNC: 105 MMOL/L (ref 94–109)
CHOLEST SERPL-MCNC: 152 MG/DL
CO2 SERPL-SCNC: 26 MMOL/L (ref 20–32)
CREAT SERPL-MCNC: 0.92 MG/DL (ref 0.66–1.25)
FASTING STATUS PATIENT QL REPORTED: YES
GFR SERPL CREATININE-BSD FRML MDRD: >90 ML/MIN/1.73M2
GLUCOSE BLD-MCNC: 104 MG/DL (ref 70–99)
HDLC SERPL-MCNC: 47 MG/DL
LDLC SERPL CALC-MCNC: 82 MG/DL
NONHDLC SERPL-MCNC: 105 MG/DL
POTASSIUM BLD-SCNC: 4.1 MMOL/L (ref 3.4–5.3)
SODIUM SERPL-SCNC: 138 MMOL/L (ref 133–144)
TRIGL SERPL-MCNC: 115 MG/DL

## 2022-11-28 PROCEDURE — 84460 ALANINE AMINO (ALT) (SGPT): CPT | Performed by: INTERNAL MEDICINE

## 2022-11-28 PROCEDURE — 80061 LIPID PANEL: CPT | Performed by: INTERNAL MEDICINE

## 2022-11-28 PROCEDURE — 80048 BASIC METABOLIC PNL TOTAL CA: CPT | Performed by: INTERNAL MEDICINE

## 2022-11-28 PROCEDURE — 36415 COLL VENOUS BLD VENIPUNCTURE: CPT | Performed by: INTERNAL MEDICINE

## 2022-11-29 ENCOUNTER — OFFICE VISIT (OUTPATIENT)
Dept: CARDIOLOGY | Facility: CLINIC | Age: 67
End: 2022-11-29
Attending: INTERNAL MEDICINE
Payer: COMMERCIAL

## 2022-11-29 VITALS
BODY MASS INDEX: 28.16 KG/M2 | WEIGHT: 207.9 LBS | DIASTOLIC BLOOD PRESSURE: 79 MMHG | HEIGHT: 72 IN | OXYGEN SATURATION: 95 % | SYSTOLIC BLOOD PRESSURE: 120 MMHG | HEART RATE: 60 BPM

## 2022-11-29 DIAGNOSIS — R60.0 PERIPHERAL EDEMA: ICD-10-CM

## 2022-11-29 DIAGNOSIS — I25.10 CORONARY ARTERY DISEASE INVOLVING NATIVE CORONARY ARTERY OF NATIVE HEART WITHOUT ANGINA PECTORIS: Primary | ICD-10-CM

## 2022-11-29 DIAGNOSIS — E78.6 HDL DEFICIENCY: Chronic | ICD-10-CM

## 2022-11-29 DIAGNOSIS — I87.2 VENOUS (PERIPHERAL) INSUFFICIENCY: Chronic | ICD-10-CM

## 2022-11-29 DIAGNOSIS — E78.2 MIXED HYPERLIPIDEMIA: ICD-10-CM

## 2022-11-29 DIAGNOSIS — Z72.0 TOBACCO ABUSE: ICD-10-CM

## 2022-11-29 DIAGNOSIS — R73.02 IMPAIRED GLUCOSE TOLERANCE: ICD-10-CM

## 2022-11-29 PROCEDURE — 99214 OFFICE O/P EST MOD 30 MIN: CPT | Performed by: INTERNAL MEDICINE

## 2022-11-29 RX ORDER — EZETIMIBE 10 MG/1
10 TABLET ORAL DAILY
Qty: 90 TABLET | Refills: 4 | Status: SHIPPED | OUTPATIENT
Start: 2022-11-29 | End: 2024-02-23

## 2022-11-29 NOTE — PROGRESS NOTES
HPI and Plan:   See dictation    Today's clinic visit entailed:  Review of the result(s) of each unique test - Lab work  Ordering of each unique test  Prescription drug management  37 minutes spent on the date of the encounter doing chart review, history and exam, documentation and further activities per the note  Provider  Link to [facility] Help Grid           Orders Placed This Encounter   Procedures     Lipid Profile     Basic metabolic panel     Lipid Profile     ALT     Follow-Up with Cardiology       Orders Placed This Encounter   Medications     ezetimibe (ZETIA) 10 MG tablet     Sig: Take 1 tablet (10 mg) by mouth daily     Dispense:  90 tablet     Refill:  4       There are no discontinued medications.      Encounter Diagnoses   Name Primary?     Coronary artery disease involving native coronary artery of native heart without angina pectoris Yes     Mixed hyperlipidemia      HDL deficiency      Venous (peripheral) insufficiency      Impaired glucose tolerance      Tobacco abuse      Peripheral edema        CURRENT MEDICATIONS:  Current Outpatient Medications   Medication Sig Dispense Refill     aspirin 81 MG tablet Take  by mouth daily.       atenolol (TENORMIN) 25 MG tablet Take 1 tablet (25 mg) by mouth daily 90 tablet 1     atorvastatin (LIPITOR) 80 MG tablet Take 1 tablet (80 mg) by mouth daily 90 tablet 1     ezetimibe (ZETIA) 10 MG tablet Take 1 tablet (10 mg) by mouth daily 90 tablet 4     IBUPROFEN PO Take  by mouth.       Loratadine (CLARITIN PO) Possibly with D         ALLERGIES     Allergies   Allergen Reactions     Crestor [Rosuvastatin] Muscle Pain (Myalgia)       PAST MEDICAL HISTORY:  Past Medical History:   Diagnosis Date     Back pain      CAD (coronary artery disease)     cardiac cath 2006: MARIBELL to PDA     Herpes pharyngitis      Hyperlipidemia LDL goal < 70      Impaired glucose tolerance      Myocardial infarction (H) 10/2006    inferior     Peripheral edema      Psoriasis      Tobacco abuse   "      PAST SURGICAL HISTORY:  Past Surgical History:   Procedure Laterality Date     bilateral cataract extractions      w/ IOL implant     HEART CATH, ANGIOPLASTY  OCt  2006    successful stent to PDa,        FAMILY HISTORY:  Family History   Problem Relation Age of Onset     Other Cancer Mother      Mental Illness Father        SOCIAL HISTORY:  Social History     Socioeconomic History     Marital status:      Spouse name: None     Number of children: None     Years of education: None     Highest education level: None   Tobacco Use     Smoking status: Every Day     Types: Cigars     Smokeless tobacco: Never     Tobacco comments:     2-3 cigars a day   Substance and Sexual Activity     Alcohol use: Yes     Drug use: No   Other Topics Concern     Parent/sibling w/ CABG, MI or angioplasty before 65F 55M? Yes     Caffeine Concern Yes     Comment: 02-03 cups a day     Sleep Concern No     Stress Concern No     Weight Concern No     Special Diet Yes     Comment: eat healthy      Exercise Yes     Comment: golf      Seat Belt Yes       Review of Systems:  Skin:  Positive for   psoriasis   Eyes:  Negative      ENT:  Negative      Respiratory:  Positive for cough had shingles in his throat, and now he coughs when it gets cold out.   Cardiovascular:  Negative      Gastroenterology: Negative      Genitourinary:  Negative      Musculoskeletal:    arthritis;joint pain arthritis in R shoulder - also pain in shoulder he had xray and mri for with no conclusion.  Neurologic:  Negative      Psychiatric:  Negative      Heme/Lymph/Imm:  Negative      Endocrine:  Negative        Physical Exam:  Vitals: /79 (BP Location: Left arm, Patient Position: Sitting)   Pulse 60   Ht 1.816 m (5' 11.5\")   Wt 94.3 kg (207 lb 14.4 oz)   SpO2 95%   BMI 28.59 kg/m      Constitutional:  cooperative, alert and oriented, well developed, well nourished, in no acute distress overweight      Skin:  warm and dry to the touch, no apparent skin " lesions or masses noted          Head:  normocephalic, no masses or lesions        Eyes:  pupils equal and round;conjunctivae and lids unremarkable xanthalasma      Lymph:      ENT:  no pallor or cyanosis, dentition good        Neck:  carotid pulses are full and equal bilaterally;no carotid bruit        Respiratory:  normal breath sounds, clear to auscultation, normal A-P diameter, normal symmetry, normal respiratory excursion, no use of accessory muscles         Cardiac: regular rhythm;normal S1 and S2;no S3 or S4;no murmurs, gallops or rubs detected                pulses full and equal                                        GI:  abdomen soft        Extremities and Muscular Skeletal:  no spinal abnormalities noted;normal muscle strength and tone   bilateral LE edema;trace          Neurological:  no gross motor deficits        Psych:  affect appropriate, oriented to time, person and place        CC  Martin Govea MD  [id number]  [place] [id number]

## 2022-11-29 NOTE — LETTER
Date:December 1, 2022      Provider requested that no letter be sent. Do not send.       [facility]

## 2022-11-29 NOTE — LETTER
11/29/2022    Physician No Ref-Primary  No address on file    RE: Kenn Naranjo       Dear Colleague,     I had the pleasure of seeing Kenn Naranjo in the [facility].  HPI and Plan:   See dictation    Today's clinic visit entailed:  Review of the result(s) of each unique test - Lab work  Ordering of each unique test  Prescription drug management  37 minutes spent on the date of the encounter doing chart review, history and exam, documentation and further activities per the note  Provider  Link to MDM Help Grid           Orders Placed This Encounter   Procedures     Lipid Profile     Basic metabolic panel     Lipid Profile     ALT     Follow-Up with Cardiology       Orders Placed This Encounter   Medications     ezetimibe (ZETIA) 10 MG tablet     Sig: Take 1 tablet (10 mg) by mouth daily     Dispense:  90 tablet     Refill:  4       There are no discontinued medications.      Encounter Diagnoses   Name Primary?     Coronary artery disease involving native coronary artery of native heart without angina pectoris Yes     Mixed hyperlipidemia      HDL deficiency      Venous (peripheral) insufficiency      Impaired glucose tolerance      Tobacco abuse      Peripheral edema        CURRENT MEDICATIONS:  Current Outpatient Medications   Medication Sig Dispense Refill     aspirin 81 MG tablet Take  by mouth daily.       atenolol (TENORMIN) 25 MG tablet Take 1 tablet (25 mg) by mouth daily 90 tablet 1     atorvastatin (LIPITOR) 80 MG tablet Take 1 tablet (80 mg) by mouth daily 90 tablet 1     ezetimibe (ZETIA) 10 MG tablet Take 1 tablet (10 mg) by mouth daily 90 tablet 4     IBUPROFEN PO Take  by mouth.       Loratadine (CLARITIN PO) Possibly with D         ALLERGIES     Allergies   Allergen Reactions     Crestor [Rosuvastatin] Muscle Pain (Myalgia)       PAST MEDICAL HISTORY:  Past Medical History:   Diagnosis Date     Back pain      CAD (coronary artery disease)     cardiac cath 2006: MARIBELL to PDA  "    Herpes pharyngitis      Hyperlipidemia LDL goal < 70      Impaired glucose tolerance      Myocardial infarction (H) 10/2006    inferior     Peripheral edema      Psoriasis      Tobacco abuse        PAST SURGICAL HISTORY:  Past Surgical History:   Procedure Laterality Date     bilateral cataract extractions      w/ IOL implant     HEART CATH, ANGIOPLASTY  OCt  2006    successful stent to PDa,        FAMILY HISTORY:  Family History   Problem Relation Age of Onset     Other Cancer Mother      Mental Illness Father        SOCIAL HISTORY:  Social History     Socioeconomic History     Marital status:      Spouse name: None     Number of children: None     Years of education: None     Highest education level: None   Tobacco Use     Smoking status: Every Day     Types: Cigars     Smokeless tobacco: Never     Tobacco comments:     2-3 cigars a day   Substance and Sexual Activity     Alcohol use: Yes     Drug use: No   Other Topics Concern     Parent/sibling w/ CABG, MI or angioplasty before 65F 55M? Yes     Caffeine Concern Yes     Comment: 02-03 cups a day     Sleep Concern No     Stress Concern No     Weight Concern No     Special Diet Yes     Comment: eat healthy      Exercise Yes     Comment: golf      Seat Belt Yes       Review of Systems:  Skin:  Positive for   psoriasis   Eyes:  Negative      ENT:  Negative      Respiratory:  Positive for cough had shingles in his throat, and now he coughs when it gets cold out.   Cardiovascular:  Negative      Gastroenterology: Negative      Genitourinary:  Negative      Musculoskeletal:    arthritis;joint pain arthritis in R shoulder - also pain in shoulder he had xray and mri for with no conclusion.  Neurologic:  Negative      Psychiatric:  Negative      Heme/Lymph/Imm:  Negative      Endocrine:  Negative        Physical Exam:  Vitals: /79 (BP Location: Left arm, Patient Position: Sitting)   Pulse 60   Ht 1.816 m (5' 11.5\")   Wt 94.3 kg (207 lb 14.4 oz)   SpO2 " 95%   BMI 28.59 kg/m      Constitutional:  cooperative, alert and oriented, well developed, well nourished, in no acute distress overweight      Skin:  warm and dry to the touch, no apparent skin lesions or masses noted          Head:  normocephalic, no masses or lesions        Eyes:  pupils equal and round;conjunctivae and lids unremarkable xanthalasma      Lymph:      ENT:  no pallor or cyanosis, dentition good        Neck:  carotid pulses are full and equal bilaterally;no carotid bruit        Respiratory:  normal breath sounds, clear to auscultation, normal A-P diameter, normal symmetry, normal respiratory excursion, no use of accessory muscles         Cardiac: regular rhythm;normal S1 and S2;no S3 or S4;no murmurs, gallops or rubs detected                pulses full and equal                                        GI:  abdomen soft        Extremities and Muscular Skeletal:  no spinal abnormalities noted;normal muscle strength and tone   bilateral LE edema;trace          Neurological:  no gross motor deficits        Psych:  affect appropriate, oriented to time, person and place        CC  Martin Govea MD  [address]  [place] [id number]                  Thank you for allowing me to participate in the care of your patient.      Sincerely,     Martin Govea MD     [facility]  cc:   Martin Govea MD  [address]  [place] [id number]

## 2022-11-30 NOTE — PROGRESS NOTES
Service Date: 11/29/2022    HISTORY OF PRESENT ILLNESS:  Aaron is a very nice 67-year-old gentleman with past medical history significant for an inferior wall myocardial infarction in 2006 treated by stenting of his posterior descending artery.  At that time, he was noted to have a 70% stenosis in his left anterior descending artery and a 70% stenosis in his circumflex coronary artery.  We brought him back for a staged intervention of his left anterior descending artery.  The stenosis had improved significantly and was now in the 30%-50% range.  His mid-circumflex stenosis was still 70%; however, the vessel beyond was quite small and we decided to treat it medically.  Ejection fraction improved all the way back to 60% with some inferior wall hypokinesia.  Fortunately he quit smoking cigarettes at that time but still smokes an occasional cigar.    Last evaluation of his coronary anatomy was a stress nuclear scan in 2012 demonstrating a very small basilar inferior defect with minimal ischemia.  Ejection fraction was 65%.    Aaron returns to clinic stating he thinks he is doing great from a cardiac standpoint.  He has a no exertional chest, arm, neck, jaw or shoulder discomfort.  He says he does think he did something to his shoulder golfing this year and clearly has problems rotating his arm in certain positions.  He has seen Orthopedic Surgery and had an MRI and received an injection in his shoulder about 2 weeks ago, and he states he thinks his shoulder is about 40% better.  There is no component with his walking regimen, climbing stairs or other activities.  He states because of it, he golfed somewhat less and was getting less activity.  He still smokes an occasional cigar but is planning on quitting altogether when he goes down to [place] this winter.  He notes no side effects or problems with his current medical regimen.  He states he tries to eat a pretty strict diet.  He only occasionally drinks alcohol  now.    ASSESSMENT AND PLAN:  Aaron appears to be doing well from a cardiac standpoint.  I do not think his shoulder discomfort is anginal in nature.  I do not think he is having any symptoms to suggest heart failure or significant arrhythmia.    Blood pressure is well controlled at 120/79 with a pulse of 60.    Weight is up a bit at 207 pounds, giving a body mass index of 28.6 with clothes on.  This is up 6 pounds over the last 4 years, so not a significant change, but we talked about the trend moving in the wrong direction.    Fasting lipid profile is minimally changed from last year.  Total cholesterol is 152, HDL is 47, LDL is 82, triglycerides are 115.  He is on atorvastatin 80.  At this time, I have told him I think we should go on Zetia and try to drive his LDL below 70.  He says I told him the same thing last year and this year he is willing to do it.  We will check a fasting lipid profile in 1 month to see how the addition of Zetia helps.    He asked if we could move his annual up a little earlier, maybe earlier in the fall, because he would rather get out of [place] earlier and avoid the snowstorm like we had today, so I will schedule a 9-month followup.  His electrolytes and liver function tests are also normal.    Thank you for allowing me to participate in his care.    Martin Govea MD, [facility]        D: 2022   T: 2022   MT: kenny    Name:     BABITA JIMENEZ  MRN:      [id number]        Account:      [id number]   :      1955           Service Date: 2022       Document: [id number]

## 2022-12-26 ENCOUNTER — TELEPHONE (OUTPATIENT)
Dept: CARDIOLOGY | Facility: CLINIC | Age: 67
End: 2022-12-26

## 2022-12-26 ENCOUNTER — LAB (OUTPATIENT)
Dept: LAB | Facility: CLINIC | Age: 67
End: 2022-12-26
Payer: COMMERCIAL

## 2022-12-26 DIAGNOSIS — I25.10 CORONARY ARTERY DISEASE INVOLVING NATIVE CORONARY ARTERY OF NATIVE HEART WITHOUT ANGINA PECTORIS: ICD-10-CM

## 2022-12-26 LAB
CHOLEST SERPL-MCNC: 123 MG/DL
FASTING STATUS PATIENT QL REPORTED: YES
HDLC SERPL-MCNC: 43 MG/DL
LDLC SERPL CALC-MCNC: 61 MG/DL
NONHDLC SERPL-MCNC: 80 MG/DL
TRIGL SERPL-MCNC: 93 MG/DL

## 2022-12-26 PROCEDURE — 36415 COLL VENOUS BLD VENIPUNCTURE: CPT | Performed by: INTERNAL MEDICINE

## 2022-12-26 PROCEDURE — 80061 LIPID PANEL: CPT | Performed by: INTERNAL MEDICINE

## 2022-12-26 NOTE — TELEPHONE ENCOUNTER
Lipid panel 12/26/2022 noted. Ordered for 2 month follow up after adding zetia 10mg daily to lipitor 80mg daily.    Component      Latest Ref Rng & Units 12/26/2022   Cholesterol      <200 mg/dL 123   Triglycerides      <150 mg/dL 93   HDL Cholesterol      >=40 mg/dL 43   LDL Cholesterol Calculated      <=100 mg/dL 61   Non HDL Cholesterol      <130 mg/dL 80   Patient Fasting > 8hrs?       Yes     Will message Dr. Govea to review

## 2022-12-27 NOTE — TELEPHONE ENCOUNTER
Martin Govea MD Anderson, Carolyn SALAS RN 12 hours ago (8:10 PM)     Nice response. Continue as is.        Left a detailed message for patient regarding lab results and message from Dr Govea. Pt has return orders for 8/2023. Pt to call back with any questions.

## 2023-01-12 DIAGNOSIS — I25.10 CORONARY ARTERY DISEASE INVOLVING NATIVE CORONARY ARTERY OF NATIVE HEART WITHOUT ANGINA PECTORIS: ICD-10-CM

## 2023-01-12 RX ORDER — ATENOLOL 25 MG/1
25 TABLET ORAL DAILY
Qty: 90 TABLET | Refills: 3 | Status: SHIPPED | OUTPATIENT
Start: 2023-01-12 | End: 2024-01-10

## 2023-03-23 DIAGNOSIS — E78.2 MIXED HYPERLIPIDEMIA: ICD-10-CM

## 2023-03-23 RX ORDER — ATORVASTATIN CALCIUM 80 MG/1
80 TABLET, FILM COATED ORAL DAILY
Qty: 90 TABLET | Refills: 1 | Status: SHIPPED | OUTPATIENT
Start: 2023-03-23 | End: 2023-08-01

## 2023-07-31 ENCOUNTER — LAB (OUTPATIENT)
Dept: LAB | Facility: CLINIC | Age: 68
End: 2023-07-31
Payer: COMMERCIAL

## 2023-07-31 DIAGNOSIS — I25.10 CORONARY ARTERY DISEASE INVOLVING NATIVE CORONARY ARTERY OF NATIVE HEART WITHOUT ANGINA PECTORIS: ICD-10-CM

## 2023-07-31 LAB
ALT SERPL W P-5'-P-CCNC: 15 U/L (ref 0–70)
ANION GAP SERPL CALCULATED.3IONS-SCNC: 13 MMOL/L (ref 7–15)
BUN SERPL-MCNC: 14.1 MG/DL (ref 8–23)
CALCIUM SERPL-MCNC: 9.2 MG/DL (ref 8.8–10.2)
CHLORIDE SERPL-SCNC: 105 MMOL/L (ref 98–107)
CHOLEST SERPL-MCNC: 121 MG/DL
CREAT SERPL-MCNC: 0.85 MG/DL (ref 0.67–1.17)
DEPRECATED HCO3 PLAS-SCNC: 20 MMOL/L (ref 22–29)
GFR SERPL CREATININE-BSD FRML MDRD: >90 ML/MIN/1.73M2
GLUCOSE SERPL-MCNC: 104 MG/DL (ref 70–99)
HDLC SERPL-MCNC: 37 MG/DL
LDLC SERPL CALC-MCNC: 68 MG/DL
NONHDLC SERPL-MCNC: 84 MG/DL
POTASSIUM SERPL-SCNC: 4.4 MMOL/L (ref 3.4–5.3)
SODIUM SERPL-SCNC: 138 MMOL/L (ref 136–145)
TRIGL SERPL-MCNC: 82 MG/DL

## 2023-07-31 PROCEDURE — 84460 ALANINE AMINO (ALT) (SGPT): CPT | Performed by: INTERNAL MEDICINE

## 2023-07-31 PROCEDURE — 80061 LIPID PANEL: CPT | Performed by: INTERNAL MEDICINE

## 2023-07-31 PROCEDURE — 80048 BASIC METABOLIC PNL TOTAL CA: CPT | Performed by: INTERNAL MEDICINE

## 2023-07-31 PROCEDURE — 36415 COLL VENOUS BLD VENIPUNCTURE: CPT | Performed by: INTERNAL MEDICINE

## 2023-08-01 ENCOUNTER — OFFICE VISIT (OUTPATIENT)
Dept: CARDIOLOGY | Facility: CLINIC | Age: 68
End: 2023-08-01
Attending: INTERNAL MEDICINE
Payer: COMMERCIAL

## 2023-08-01 VITALS
OXYGEN SATURATION: 94 % | HEART RATE: 57 BPM | DIASTOLIC BLOOD PRESSURE: 76 MMHG | BODY MASS INDEX: 28.71 KG/M2 | SYSTOLIC BLOOD PRESSURE: 108 MMHG | WEIGHT: 212 LBS | HEIGHT: 72 IN

## 2023-08-01 DIAGNOSIS — E78.6 HDL DEFICIENCY: Chronic | ICD-10-CM

## 2023-08-01 DIAGNOSIS — I87.2 VENOUS (PERIPHERAL) INSUFFICIENCY: Chronic | ICD-10-CM

## 2023-08-01 DIAGNOSIS — I25.10 CORONARY ARTERY DISEASE INVOLVING NATIVE CORONARY ARTERY OF NATIVE HEART WITHOUT ANGINA PECTORIS: Primary | ICD-10-CM

## 2023-08-01 DIAGNOSIS — Z72.0 TOBACCO ABUSE: ICD-10-CM

## 2023-08-01 DIAGNOSIS — E78.2 MIXED HYPERLIPIDEMIA: ICD-10-CM

## 2023-08-01 DIAGNOSIS — R73.02 IMPAIRED GLUCOSE TOLERANCE: ICD-10-CM

## 2023-08-01 DIAGNOSIS — R60.0 PERIPHERAL EDEMA: ICD-10-CM

## 2023-08-01 PROCEDURE — 99214 OFFICE O/P EST MOD 30 MIN: CPT | Performed by: INTERNAL MEDICINE

## 2023-08-01 RX ORDER — ROSUVASTATIN CALCIUM 40 MG/1
40 TABLET, COATED ORAL DAILY
Qty: 90 TABLET | Refills: 4 | Status: SHIPPED | OUTPATIENT
Start: 2023-08-01 | End: 2024-08-19

## 2023-08-01 NOTE — LETTER
8/1/2023    Physician No Ref-Primary  No address on file    RE: Kenn JACK Marcella       Dear Colleague,     I had the pleasure of seeing Kenn Naranjo in the [facility].  HPI and Plan:      Aaron is a very nice 68-year-old gentleman with past medical history significant for an inferior wall myocardial infarction in 2006 treated by stenting of his posterior descending artery.  At that time, he was noted to have a 70% stenosis in his left anterior descending artery and a 70% stenosis in his circumflex coronary artery.  We brought him back for a staged intervention of his left anterior descending artery.  The stenosis had improved significantly and was now in the 30%-50% range.  His mid-circumflex stenosis was still 70%; however, the vessel beyond was quite small and we decided to treat it medically.  Ejection fraction improved all the way back to 60% with some inferior wall hypokinesia.  Fortunately he quit smoking cigarettes at that time but still smokes an occasional cigar.     Last evaluation of his coronary anatomy was a stress nuclear scan in 2012 demonstrating a very small basilar inferior defect with minimal ischemia.  Ejection fraction was 65%.     Aaron returns to clinic stating he thinks he is doing great from a cardiac standpoint.  He has a no exertional chest, arm, neck, jaw or shoulder discomfort.  He had problems with his shoulder last year and as result was not golfing as much.  Ultimately he has had a good result with some acupuncture and is back golfing.  This has unfortunately resulted in further weight gain.  he still smokes an occasional cigar but is planning on quitting altogether when he goes down to [place] this winter.  He notes no side effects or problems with his current medical regimen.  He states he tries to eat a pretty strict diet.  He only occasionally drinks alcohol.     ASSESSMENT AND PLAN:  Aaron appears to be doing well from a cardiac standpoint.   I do not think  he is having any symptoms to suggest ischemia, heart failure or significant arrhythmia.     Blood pressure is well controlled at 108/76 with a pulse of 57.     Weight unfortunately continues to trend up with his decreased activity having gained over 10 pounds since 2019.  We talked about the importance of regular exercise and weight loss.  This unfortunately is showing up in his cholesterol profile.     We added Zetia last year with a good result.  Unfortunately he has backslid since then with his HDL dropping and LDL going up to 68.  We talked about the fact that the guidelines now recommend below 55.  I have admonished him to get back to his regular exercise and lose the weight he has gained.  I will try switching him back to rosuvastatin.  He had problems tolerating this in the past but it might be better for his LDL and his HDL.  I suspect with more diligent lifestyle he can get to goal.     We also talked about the importance of cutting out his cigars.     Thank you for allowing me to participate in his care.     Martin Govea MD, [facility]  Today's clinic visit entailed:  Review of the result(s) of each unique test - lab work  Ordering of each unique test  Prescription drug management  38 minutes spent by me on the date of the encounter doing chart review, history and exam, documentation and further activities per the note  Provider  Link to [facility] Help Grid     The level of medical decision making during this visit was of moderate complexity.      Orders Placed This Encounter   Procedures    Basic metabolic panel    Lipid Profile    ALT    Lipid Profile    ALT    Follow-Up with Cardiology       Orders Placed This Encounter   Medications    rosuvastatin (CRESTOR) 40 MG tablet     Sig: Take 1 tablet (40 mg) by mouth daily     Dispense:  90 tablet     Refill:  4       Medications Discontinued During This Encounter   Medication Reason    atorvastatin (LIPITOR) 80 MG tablet          Encounter Diagnoses   Name  Primary?    Coronary artery disease involving native coronary artery of native heart without angina pectoris Yes    Mixed hyperlipidemia     HDL deficiency     Impaired glucose tolerance     Tobacco abuse     Venous (peripheral) insufficiency     Peripheral edema        CURRENT MEDICATIONS:  Current Outpatient Medications   Medication Sig Dispense Refill    aspirin 81 MG tablet Take  by mouth daily.      atenolol (TENORMIN) 25 MG tablet Take 1 tablet (25 mg) by mouth daily 90 tablet 3    ezetimibe (ZETIA) 10 MG tablet Take 1 tablet (10 mg) by mouth daily 90 tablet 4    IBUPROFEN PO Take  by mouth.      Loratadine (CLARITIN PO) Possibly with D      rosuvastatin (CRESTOR) 40 MG tablet Take 1 tablet (40 mg) by mouth daily 90 tablet 4       ALLERGIES     Allergies   Allergen Reactions    Crestor [Rosuvastatin] Muscle Pain (Myalgia)       PAST MEDICAL HISTORY:  Past Medical History:   Diagnosis Date    Back pain     CAD (coronary artery disease)     cardiac cath 2006: MARIBELL to PDA    Herpes pharyngitis     Hyperlipidemia LDL goal < 70     Impaired glucose tolerance     Myocardial infarction (H) 10/2006    inferior    Peripheral edema     Psoriasis     Tobacco abuse        PAST SURGICAL HISTORY:  Past Surgical History:   Procedure Laterality Date    bilateral cataract extractions      w/ IOL implant    HEART CATH, ANGIOPLASTY  OCt  2006    successful stent to PDa,        FAMILY HISTORY:  Family History   Problem Relation Age of Onset    Other Cancer Mother     Mental Illness Father        SOCIAL HISTORY:  Social History     Socioeconomic History    Marital status:      Spouse name: None    Number of children: None    Years of education: None    Highest education level: None   Tobacco Use    Smoking status: Some Days     Types: Cigars    Smokeless tobacco: Never    Tobacco comments:     2-3 cigars most days but not every day   Substance and Sexual Activity    Alcohol use: Yes    Drug use: No   Other Topics Concern     "Parent/sibling w/ CABG, MI or angioplasty before 65F 55M? Yes    Caffeine Concern Yes     Comment: 02-03 cups a day    Sleep Concern No    Stress Concern No    Weight Concern No    Special Diet Yes     Comment: eat healthy     Exercise Yes     Comment: golf     Seat Belt Yes       Review of Systems:  Skin:  not assessed       Eyes:  not assessed      ENT:  not assessed      Respiratory:  Negative       Cardiovascular:    edema;Positive for occ  Gastroenterology: not assessed      Genitourinary:  not assessed      Musculoskeletal:  not assessed      Neurologic:  not assessed      Psychiatric:  not assessed      Heme/Lymph/Imm:  not assessed      Endocrine:  not assessed        Physical Exam:  Vitals: /76   Pulse 57   Ht 1.816 m (5' 11.5\")   Wt 96.2 kg (212 lb)   SpO2 94%   BMI 29.16 kg/m      Constitutional:  cooperative, alert and oriented, well developed, well nourished, in no acute distress overweight      Skin:  warm and dry to the touch, no apparent skin lesions or masses noted          Head:  normocephalic, no masses or lesions        Eyes:  pupils equal and round;conjunctivae and lids unremarkable xanthalasma      Lymph:      ENT:  no pallor or cyanosis, dentition good        Neck:  carotid pulses are full and equal bilaterally;no carotid bruit        Respiratory:  normal breath sounds, clear to auscultation, normal A-P diameter, normal symmetry, normal respiratory excursion, no use of accessory muscles         Cardiac: regular rhythm;normal S1 and S2;no S3 or S4;no murmurs, gallops or rubs detected                pulses full and equal                                        GI:           Extremities and Muscular Skeletal:  no spinal abnormalities noted;normal muscle strength and tone   bilateral LE edema;trace;varicose vein          Neurological:  no gross motor deficits        Psych:  affect appropriate, oriented to time, person and place        CC  Martin Govea MD  [id number]  [place] [address]      Thank you for allowing me to participate in the care of your patient.      Sincerely,     Martin Govea MD     [facility]

## 2023-08-01 NOTE — PROGRESS NOTES
HPI and Plan:      Aaron is a very nice 68-year-old gentleman with past medical history significant for an inferior wall myocardial infarction in 2006 treated by stenting of his posterior descending artery.  At that time, he was noted to have a 70% stenosis in his left anterior descending artery and a 70% stenosis in his circumflex coronary artery.  We brought him back for a staged intervention of his left anterior descending artery.  The stenosis had improved significantly and was now in the 30%-50% range.  His mid-circumflex stenosis was still 70%; however, the vessel beyond was quite small and we decided to treat it medically.  Ejection fraction improved all the way back to 60% with some inferior wall hypokinesia.  Fortunately he quit smoking cigarettes at that time but still smokes an occasional cigar.     Last evaluation of his coronary anatomy was a stress nuclear scan in 2012 demonstrating a very small basilar inferior defect with minimal ischemia.  Ejection fraction was 65%.     Aaron returns to clinic stating he thinks he is doing great from a cardiac standpoint.  He has a no exertional chest, arm, neck, jaw or shoulder discomfort.  He had problems with his shoulder last year and as result was not golfing as much.  Ultimately he has had a good result with some acupuncture and is back golfing.  This has unfortunately resulted in further weight gain.  he still smokes an occasional cigar but is planning on quitting altogether when he goes down to [place] this winter.  He notes no side effects or problems with his current medical regimen.  He states he tries to eat a pretty strict diet.  He only occasionally drinks alcohol.     ASSESSMENT AND PLAN:  Aaron appears to be doing well from a cardiac standpoint.   I do not think he is having any symptoms to suggest ischemia, heart failure or significant arrhythmia.     Blood pressure is well controlled at 108/76 with a pulse of 57.     Weight unfortunately continues  to trend up with his decreased activity having gained over 10 pounds since 2019.  We talked about the importance of regular exercise and weight loss.  This unfortunately is showing up in his cholesterol profile.     We added Zetia last year with a good result.  Unfortunately he has backslid since then with his HDL dropping and LDL going up to 68.  We talked about the fact that the guidelines now recommend below 55.  I have admonished him to get back to his regular exercise and lose the weight he has gained.  I will try switching him back to rosuvastatin.  He had problems tolerating this in the past but it might be better for his LDL and his HDL.  I suspect with more diligent lifestyle he can get to goal.     We also talked about the importance of cutting out his cigars.     Thank you for allowing me to participate in his care.     Martin Govea MD, [facility]  Today's clinic visit entailed:  Review of the result(s) of each unique test - lab work  Ordering of each unique test  Prescription drug management  38 minutes spent by me on the date of the encounter doing chart review, history and exam, documentation and further activities per the note  Provider  Link to [facility] Help Grid     The level of medical decision making during this visit was of moderate complexity.      Orders Placed This Encounter   Procedures    Basic metabolic panel    Lipid Profile    ALT    Lipid Profile    ALT    Follow-Up with Cardiology       Orders Placed This Encounter   Medications    rosuvastatin (CRESTOR) 40 MG tablet     Sig: Take 1 tablet (40 mg) by mouth daily     Dispense:  90 tablet     Refill:  4       Medications Discontinued During This Encounter   Medication Reason    atorvastatin (LIPITOR) 80 MG tablet          Encounter Diagnoses   Name Primary?    Coronary artery disease involving native coronary artery of native heart without angina pectoris Yes    Mixed hyperlipidemia     HDL deficiency     Impaired glucose tolerance      Tobacco abuse     Venous (peripheral) insufficiency     Peripheral edema        CURRENT MEDICATIONS:  Current Outpatient Medications   Medication Sig Dispense Refill    aspirin 81 MG tablet Take  by mouth daily.      atenolol (TENORMIN) 25 MG tablet Take 1 tablet (25 mg) by mouth daily 90 tablet 3    ezetimibe (ZETIA) 10 MG tablet Take 1 tablet (10 mg) by mouth daily 90 tablet 4    IBUPROFEN PO Take  by mouth.      Loratadine (CLARITIN PO) Possibly with D      rosuvastatin (CRESTOR) 40 MG tablet Take 1 tablet (40 mg) by mouth daily 90 tablet 4       ALLERGIES     Allergies   Allergen Reactions    Crestor [Rosuvastatin] Muscle Pain (Myalgia)       PAST MEDICAL HISTORY:  Past Medical History:   Diagnosis Date    Back pain     CAD (coronary artery disease)     cardiac cath 2006: MARIBELL to PDA    Herpes pharyngitis     Hyperlipidemia LDL goal < 70     Impaired glucose tolerance     Myocardial infarction (H) 10/2006    inferior    Peripheral edema     Psoriasis     Tobacco abuse        PAST SURGICAL HISTORY:  Past Surgical History:   Procedure Laterality Date    bilateral cataract extractions      w/ IOL implant    HEART CATH, ANGIOPLASTY  OCt  2006    successful stent to PDa,        FAMILY HISTORY:  Family History   Problem Relation Age of Onset    Other Cancer Mother     Mental Illness Father        SOCIAL HISTORY:  Social History     Socioeconomic History    Marital status:      Spouse name: None    Number of children: None    Years of education: None    Highest education level: None   Tobacco Use    Smoking status: Some Days     Types: Cigars    Smokeless tobacco: Never    Tobacco comments:     2-3 cigars most days but not every day   Substance and Sexual Activity    Alcohol use: Yes    Drug use: No   Other Topics Concern    Parent/sibling w/ CABG, MI or angioplasty before 65F 55M? Yes    Caffeine Concern Yes     Comment: 02-03 cups a day    Sleep Concern No    Stress Concern No    Weight Concern No    Special  "Diet Yes     Comment: eat healthy     Exercise Yes     Comment: golf     Seat Belt Yes       Review of Systems:  Skin:  not assessed       Eyes:  not assessed      ENT:  not assessed      Respiratory:  Negative       Cardiovascular:    edema;Positive for occ  Gastroenterology: not assessed      Genitourinary:  not assessed      Musculoskeletal:  not assessed      Neurologic:  not assessed      Psychiatric:  not assessed      Heme/Lymph/Imm:  not assessed      Endocrine:  not assessed        Physical Exam:  Vitals: /76   Pulse 57   Ht 1.816 m (5' 11.5\")   Wt 96.2 kg (212 lb)   SpO2 94%   BMI 29.16 kg/m      Constitutional:  cooperative, alert and oriented, well developed, well nourished, in no acute distress overweight      Skin:  warm and dry to the touch, no apparent skin lesions or masses noted          Head:  normocephalic, no masses or lesions        Eyes:  pupils equal and round;conjunctivae and lids unremarkable xanthalasma      Lymph:      ENT:  no pallor or cyanosis, dentition good        Neck:  carotid pulses are full and equal bilaterally;no carotid bruit        Respiratory:  normal breath sounds, clear to auscultation, normal A-P diameter, normal symmetry, normal respiratory excursion, no use of accessory muscles         Cardiac: regular rhythm;normal S1 and S2;no S3 or S4;no murmurs, gallops or rubs detected                pulses full and equal                                        GI:           Extremities and Muscular Skeletal:  no spinal abnormalities noted;normal muscle strength and tone   bilateral LE edema;trace;varicose vein          Neurological:  no gross motor deficits        Psych:  affect appropriate, oriented to time, person and place        CC  Martin Govea MD  [address]  [place] [id number]                "

## 2023-09-11 ENCOUNTER — LAB (OUTPATIENT)
Dept: LAB | Facility: CLINIC | Age: 68
End: 2023-09-11
Payer: COMMERCIAL

## 2023-09-11 DIAGNOSIS — I25.10 CORONARY ARTERY DISEASE INVOLVING NATIVE CORONARY ARTERY OF NATIVE HEART WITHOUT ANGINA PECTORIS: ICD-10-CM

## 2023-09-11 LAB — ALT SERPL W P-5'-P-CCNC: 15 U/L (ref 0–70)

## 2023-09-11 PROCEDURE — 36415 COLL VENOUS BLD VENIPUNCTURE: CPT | Performed by: INTERNAL MEDICINE

## 2023-09-11 PROCEDURE — 84460 ALANINE AMINO (ALT) (SGPT): CPT | Performed by: INTERNAL MEDICINE

## 2023-09-11 PROCEDURE — 80061 LIPID PANEL: CPT | Performed by: INTERNAL MEDICINE

## 2023-09-12 ENCOUNTER — TELEPHONE (OUTPATIENT)
Dept: CARDIOLOGY | Facility: CLINIC | Age: 68
End: 2023-09-12
Payer: COMMERCIAL

## 2023-09-12 LAB
CHOLEST SERPL-MCNC: 114 MG/DL
HDLC SERPL-MCNC: 37 MG/DL
LDLC SERPL CALC-MCNC: 60 MG/DL
NONHDLC SERPL-MCNC: 77 MG/DL
TRIGL SERPL-MCNC: 83 MG/DL

## 2023-09-12 NOTE — TELEPHONE ENCOUNTER
Martin Govea MD Theis, Marcie J, RN1 hour ago (12:42 PM)     Much improved.  Continue to maximize lifestyle with exercise, diet and weight loss.     Spoke to patient, reviewed labs and message from Dr Govea. He verbalized understanding, no questions at this time.

## 2023-09-12 NOTE — TELEPHONE ENCOUNTER
FLP/ALT done 9-11-23   Chol 114, HDL 37, LDL 60, TG 83, ALIT 15,      Currently on Rosuvastatin 40 mg daily and Zetia     Last Dr. Govea OV 8-1-23   We added Zetia last year with a good result. Unfortunately he has backslid since then with his HDL dropping and LDL going up to 68. We talked about the fact that the guidelines now recommend below 55. I have admonished him to get back to his regular exercise and lose the weight he has gained. I will try switching him back to rosuvastatin. He had problems tolerating this in the past but it might be better for his LDL and his HDL. I suspect with more diligent lifestyle he can get to goal.   - F/Up on 1 year.

## 2024-01-10 DIAGNOSIS — I25.10 CORONARY ARTERY DISEASE INVOLVING NATIVE CORONARY ARTERY OF NATIVE HEART WITHOUT ANGINA PECTORIS: ICD-10-CM

## 2024-01-10 RX ORDER — ATENOLOL 25 MG/1
25 TABLET ORAL DAILY
Qty: 90 TABLET | Refills: 1 | Status: SHIPPED | OUTPATIENT
Start: 2024-01-10 | End: 2024-07-09

## 2024-02-23 DIAGNOSIS — I25.10 CORONARY ARTERY DISEASE INVOLVING NATIVE CORONARY ARTERY OF NATIVE HEART WITHOUT ANGINA PECTORIS: ICD-10-CM

## 2024-02-23 RX ORDER — EZETIMIBE 10 MG/1
10 TABLET ORAL DAILY
Qty: 90 TABLET | Refills: 1 | Status: SHIPPED | OUTPATIENT
Start: 2024-02-23 | End: 2024-08-16

## 2024-07-09 DIAGNOSIS — I25.10 CORONARY ARTERY DISEASE INVOLVING NATIVE CORONARY ARTERY OF NATIVE HEART WITHOUT ANGINA PECTORIS: ICD-10-CM

## 2024-07-09 RX ORDER — ATENOLOL 25 MG/1
25 TABLET ORAL DAILY
Qty: 90 TABLET | Refills: 1 | Status: SHIPPED | OUTPATIENT
Start: 2024-07-09

## 2024-08-16 DIAGNOSIS — I25.10 CORONARY ARTERY DISEASE INVOLVING NATIVE CORONARY ARTERY OF NATIVE HEART WITHOUT ANGINA PECTORIS: ICD-10-CM

## 2024-08-16 RX ORDER — EZETIMIBE 10 MG/1
10 TABLET ORAL DAILY
Qty: 90 TABLET | Refills: 0 | Status: SHIPPED | OUTPATIENT
Start: 2024-08-16

## 2024-08-19 DIAGNOSIS — I25.10 CORONARY ARTERY DISEASE INVOLVING NATIVE CORONARY ARTERY OF NATIVE HEART WITHOUT ANGINA PECTORIS: ICD-10-CM

## 2024-08-19 RX ORDER — ROSUVASTATIN CALCIUM 40 MG/1
40 TABLET, COATED ORAL DAILY
Qty: 90 TABLET | Refills: 0 | Status: SHIPPED | OUTPATIENT
Start: 2024-08-19

## 2024-10-07 DIAGNOSIS — I25.10 CORONARY ARTERY DISEASE INVOLVING NATIVE CORONARY ARTERY OF NATIVE HEART WITHOUT ANGINA PECTORIS: Primary | ICD-10-CM

## 2024-10-07 DIAGNOSIS — E78.2 MIXED HYPERLIPIDEMIA: ICD-10-CM

## 2024-10-10 ENCOUNTER — LAB (OUTPATIENT)
Dept: LAB | Facility: CLINIC | Age: 69
End: 2024-10-10
Payer: COMMERCIAL

## 2024-10-10 DIAGNOSIS — E78.2 MIXED HYPERLIPIDEMIA: ICD-10-CM

## 2024-10-10 DIAGNOSIS — I25.10 CORONARY ARTERY DISEASE INVOLVING NATIVE CORONARY ARTERY OF NATIVE HEART WITHOUT ANGINA PECTORIS: ICD-10-CM

## 2024-10-10 LAB
ALT SERPL W P-5'-P-CCNC: 14 U/L (ref 0–70)
ANION GAP SERPL CALCULATED.3IONS-SCNC: 10 MMOL/L (ref 7–15)
BUN SERPL-MCNC: 13.5 MG/DL (ref 8–23)
CALCIUM SERPL-MCNC: 9.2 MG/DL (ref 8.8–10.4)
CHLORIDE SERPL-SCNC: 107 MMOL/L (ref 98–107)
CHOLEST SERPL-MCNC: 119 MG/DL
CREAT SERPL-MCNC: 0.85 MG/DL (ref 0.67–1.17)
EGFRCR SERPLBLD CKD-EPI 2021: >90 ML/MIN/1.73M2
FASTING STATUS PATIENT QL REPORTED: YES
GLUCOSE SERPL-MCNC: 104 MG/DL (ref 70–99)
HCO3 SERPL-SCNC: 21 MMOL/L (ref 22–29)
HDLC SERPL-MCNC: 35 MG/DL
LDLC SERPL CALC-MCNC: 62 MG/DL
NONHDLC SERPL-MCNC: 84 MG/DL
POTASSIUM SERPL-SCNC: 4.5 MMOL/L (ref 3.4–5.3)
SODIUM SERPL-SCNC: 138 MMOL/L (ref 135–145)
TRIGL SERPL-MCNC: 112 MG/DL

## 2024-10-10 PROCEDURE — 84460 ALANINE AMINO (ALT) (SGPT): CPT

## 2024-10-10 PROCEDURE — 36415 COLL VENOUS BLD VENIPUNCTURE: CPT

## 2024-10-10 PROCEDURE — 80048 BASIC METABOLIC PNL TOTAL CA: CPT

## 2024-10-10 PROCEDURE — 80061 LIPID PANEL: CPT

## 2024-10-11 ENCOUNTER — OFFICE VISIT (OUTPATIENT)
Dept: CARDIOLOGY | Facility: CLINIC | Age: 69
End: 2024-10-11
Payer: COMMERCIAL

## 2024-10-11 VITALS
DIASTOLIC BLOOD PRESSURE: 72 MMHG | WEIGHT: 206.9 LBS | HEART RATE: 55 BPM | OXYGEN SATURATION: 96 % | HEIGHT: 70 IN | BODY MASS INDEX: 29.62 KG/M2 | SYSTOLIC BLOOD PRESSURE: 118 MMHG

## 2024-10-11 DIAGNOSIS — R60.0 PERIPHERAL EDEMA: ICD-10-CM

## 2024-10-11 DIAGNOSIS — I25.10 CORONARY ARTERY DISEASE INVOLVING NATIVE CORONARY ARTERY OF NATIVE HEART WITHOUT ANGINA PECTORIS: Primary | ICD-10-CM

## 2024-10-11 DIAGNOSIS — E78.6 HDL DEFICIENCY: Chronic | ICD-10-CM

## 2024-10-11 DIAGNOSIS — R73.02 IMPAIRED GLUCOSE TOLERANCE: ICD-10-CM

## 2024-10-11 DIAGNOSIS — E78.2 MIXED HYPERLIPIDEMIA: ICD-10-CM

## 2024-10-11 DIAGNOSIS — Z72.0 TOBACCO ABUSE: ICD-10-CM

## 2024-10-11 DIAGNOSIS — I87.2 VENOUS (PERIPHERAL) INSUFFICIENCY: Chronic | ICD-10-CM

## 2024-10-11 PROCEDURE — 99214 OFFICE O/P EST MOD 30 MIN: CPT | Performed by: INTERNAL MEDICINE

## 2024-10-11 NOTE — PROGRESS NOTES
HPI and Plan:    Aaron is a very nice 69-year-old gentleman with past medical history significant for an inferior wall myocardial infarction in 2006 treated by stenting of his posterior descending artery.  At that time, he was noted to have a 70% stenosis in his left anterior descending artery and a 70% stenosis in his circumflex coronary artery.  We brought him back for a staged intervention of his left anterior descending artery.  The stenosis had improved significantly and was now in the 30%-50% range.  His mid-circumflex stenosis was still 70%; however, the vessel beyond was quite small and we decided to treat it medically.  Ejection fraction improved all the way back to 60% with some inferior wall hypokinesia.  Fortunately he quit smoking cigarettes at that time but still smokes an occasional cigar.     Last evaluation of his coronary anatomy was a stress nuclear scan in 2012 demonstrating a very small basilar inferior defect with minimal ischemia.  Ejection fraction was 65%.     Aaron returns to clinic stating he thinks he is doing great from a cardiac standpoint.  He has a no exertional chest, arm, neck, jaw or shoulder discomfort.  He states he exercises regularly walking and doing some calisthenics.  He still golfs and none of this causes him any symptoms.  He still smokes an occasional cigar.  He notes no side effects or problems with his current medical regimen.  He states he tries to eat a pretty strict diet.  He only occasionally drinks alcohol.     ASSESSMENT AND PLAN:  Aaron appears to be doing well from a cardiac standpoint.   I do not think he is having any symptoms to suggest ischemia, heart failure or significant arrhythmia.  We talked about running a stress test and at this time he is not interested.     Blood pressure is well controlled at 18/72 with a pulse of 55.     Weight is unchanged over time 206 pounds giving a body mass index of 29.7.  I encouraged him to try to lose weight.  I also  emphasized the importance of a consistent regular exercise regiment to include 30 minutes of aerobic activity 5 times a week and 2 days of resistance activity.  We talked about the fact that maintaining muscle mass is what determines his basic metabolic rate.     Fasting lipid profile on 40 mg rosuvastatin 10 mg of Zetia his total cholesterol 119, HDL 35, LDL 62 and triglycerides of 112.  I will continue his current medical regimen as is.    Creatinine and electrolytes are normal.    We talked about his peripheral edema which waxes and wanes.  Talked about factors that exacerbate this including inactivity, high salt meals, heat and humidity.  He states he watches the salt in his diet.  We talked about elevating his legs if it should get particularly bad.     We also talked about the importance of cutting out his cigars.     I will have him follow-up in 1 year.  Historically I have alternated every other year visits with an ELAINE.  He will need to get reestablished with an ELAINE as well.  I will have him establish with a new cardiologist first, next year.  Thank you for allowing me to participate in his care.     Martin Govea MD, [facility]    Today's clinic visit entailed:  Review of the result(s) of each unique test - lab work  Ordering of each unique test  Prescription drug management  30 minutes spent by me on the date of the encounter doing chart review, history and exam, documentation and further activities per the note  Provider  Link to [facility] Help Grid           Orders Placed This Encounter   Procedures    Basic metabolic panel    Lipid Profile    ALT    Follow-Up with Cardiology       No orders of the defined types were placed in this encounter.      There are no discontinued medications.      Encounter Diagnoses   Name Primary?    Coronary artery disease involving native coronary artery of native heart without angina pectoris Yes    Tobacco abuse     Impaired glucose tolerance     Mixed hyperlipidemia     HDL  deficiency     Peripheral edema     Venous (peripheral) insufficiency        CURRENT MEDICATIONS:  Current Outpatient Medications   Medication Sig Dispense Refill    aspirin 81 MG tablet Take  by mouth daily.      atenolol (TENORMIN) 25 MG tablet Take 1 tablet (25 mg) by mouth daily 90 tablet 1    ezetimibe (ZETIA) 10 MG tablet Take 1 tablet (10 mg) by mouth daily 90 tablet 0    IBUPROFEN PO Take  by mouth.      Loratadine (CLARITIN PO) Possibly with D      rosuvastatin (CRESTOR) 40 MG tablet Take 1 tablet (40 mg) by mouth daily 90 tablet 0       ALLERGIES     Allergies   Allergen Reactions    Crestor [Rosuvastatin] Muscle Pain (Myalgia)       PAST MEDICAL HISTORY:  Past Medical History:   Diagnosis Date    Back pain     CAD (coronary artery disease)     cardiac cath 2006: MARIBELL to PDA    Herpes pharyngitis     Hyperlipidemia LDL goal < 70     Impaired glucose tolerance     Myocardial infarction (H) 10/2006    inferior    Peripheral edema     Psoriasis     Tobacco abuse        PAST SURGICAL HISTORY:  Past Surgical History:   Procedure Laterality Date    bilateral cataract extractions      w/ IOL implant    HEART CATH, ANGIOPLASTY  OCt  2006    successful stent to PDa,        FAMILY HISTORY:  Family History   Problem Relation Age of Onset    Other Cancer Mother     Mental Illness Father        SOCIAL HISTORY:  Social History     Socioeconomic History    Marital status:      Spouse name: None    Number of children: None    Years of education: None    Highest education level: None   Tobacco Use    Smoking status: Some Days     Types: Cigars    Smokeless tobacco: Never    Tobacco comments:     2-3 cigars most days but not every day   Substance and Sexual Activity    Alcohol use: Yes    Drug use: No   Other Topics Concern    Parent/sibling w/ CABG, MI or angioplasty before 65F 55M? Yes    Caffeine Concern Yes     Comment: 02-03 cups a day    Sleep Concern No    Stress Concern No    Weight Concern No    Special Diet  "Yes     Comment: eat healthy     Exercise Yes     Comment: golf     Seat Belt Yes       Review of Systems:  Skin:  Negative       Eyes:  Negative      ENT:  Negative      Respiratory:  Negative       Cardiovascular:    Positive for;dizziness in bed  Gastroenterology: Negative      Genitourinary:  Negative      Musculoskeletal:  Negative      Neurologic:  Negative      Psychiatric:  Negative      Heme/Lymph/Imm:  Negative      Endocrine:  Negative        Physical Exam:  Vitals: /72   Pulse 55   Ht 1.778 m (5' 10\")   Wt 93.8 kg (206 lb 14.4 oz)   SpO2 96%   BMI 29.69 kg/m      Constitutional:  cooperative, alert and oriented, well developed, well nourished, in no acute distress overweight      Skin:  warm and dry to the touch, no apparent skin lesions or masses noted          Head:  normocephalic, no masses or lesions        Eyes:  pupils equal and round;conjunctivae and lids unremarkable xanthalasma      Lymph:      ENT:  no pallor or cyanosis, dentition good        Neck:  carotid pulses are full and equal bilaterally;no carotid bruit        Respiratory:  normal breath sounds, clear to auscultation, normal A-P diameter, normal symmetry, normal respiratory excursion, no use of accessory muscles         Cardiac: regular rhythm;no murmurs, gallops or rubs detected                pulses full and equal                                        GI:           Extremities and Muscular Skeletal:  no spinal abnormalities noted;normal muscle strength and tone   bilateral LE edema;trace;varicose vein          Neurological:  no gross motor deficits        Psych:  affect appropriate, oriented to time, person and place        CC  Martin Govea MD  [address]  [place] [id number]                "

## 2024-10-11 NOTE — LETTER
10/11/2024    Physician No Ref-Primary  No address on file    RE: Kenn JACK Marcella       Dear Colleague,     I had the pleasure of seeing Kenn Naranjo in the [facility].  HPI and Plan:    Aaron is a very nice 69-year-old gentleman with past medical history significant for an inferior wall myocardial infarction in 2006 treated by stenting of his posterior descending artery.  At that time, he was noted to have a 70% stenosis in his left anterior descending artery and a 70% stenosis in his circumflex coronary artery.  We brought him back for a staged intervention of his left anterior descending artery.  The stenosis had improved significantly and was now in the 30%-50% range.  His mid-circumflex stenosis was still 70%; however, the vessel beyond was quite small and we decided to treat it medically.  Ejection fraction improved all the way back to 60% with some inferior wall hypokinesia.  Fortunately he quit smoking cigarettes at that time but still smokes an occasional cigar.     Last evaluation of his coronary anatomy was a stress nuclear scan in 2012 demonstrating a very small basilar inferior defect with minimal ischemia.  Ejection fraction was 65%.     Aaron returns to clinic stating he thinks he is doing great from a cardiac standpoint.  He has a no exertional chest, arm, neck, jaw or shoulder discomfort.  He states he exercises regularly walking and doing some calisthenics.  He still golfs and none of this causes him any symptoms.  He still smokes an occasional cigar.  He notes no side effects or problems with his current medical regimen.  He states he tries to eat a pretty strict diet.  He only occasionally drinks alcohol.     ASSESSMENT AND PLAN:  Aaron appears to be doing well from a cardiac standpoint.   I do not think he is having any symptoms to suggest ischemia, heart failure or significant arrhythmia.  We talked about running a stress test and at this time he is not interested.      Blood pressure is well controlled at 18/72 with a pulse of 55.     Weight is unchanged over time 206 pounds giving a body mass index of 29.7.  I encouraged him to try to lose weight.  I also emphasized the importance of a consistent regular exercise regiment to include 30 minutes of aerobic activity 5 times a week and 2 days of resistance activity.  We talked about the fact that maintaining muscle mass is what determines his basic metabolic rate.     Fasting lipid profile on 40 mg rosuvastatin 10 mg of Zetia his total cholesterol 119, HDL 35, LDL 62 and triglycerides of 112.  I will continue his current medical regimen as is.    Creatinine and electrolytes are normal.    We talked about his peripheral edema which waxes and wanes.  Talked about factors that exacerbate this including inactivity, high salt meals, heat and humidity.  He states he watches the salt in his diet.  We talked about elevating his legs if it should get particularly bad.     We also talked about the importance of cutting out his cigars.     I will have him follow-up in 1 year.  Historically I have alternated every other year visits with an ELAINE.  He will need to get reestablished with an ELAINE as well.  I will have him establish with a new cardiologist first, next year.  Thank you for allowing me to participate in his care.     Martin Govea MD, [facility]    Today's clinic visit entailed:  Review of the result(s) of each unique test - lab work  Ordering of each unique test  Prescription drug management  30 minutes spent by me on the date of the encounter doing chart review, history and exam, documentation and further activities per the note  Provider  Link to [facility] Help Grid           Orders Placed This Encounter   Procedures     Basic metabolic panel     Lipid Profile     ALT     Follow-Up with Cardiology       No orders of the defined types were placed in this encounter.      There are no discontinued medications.      Encounter Diagnoses    Name Primary?     Coronary artery disease involving native coronary artery of native heart without angina pectoris Yes     Tobacco abuse      Impaired glucose tolerance      Mixed hyperlipidemia      HDL deficiency      Peripheral edema      Venous (peripheral) insufficiency        CURRENT MEDICATIONS:  Current Outpatient Medications   Medication Sig Dispense Refill     aspirin 81 MG tablet Take  by mouth daily.       atenolol (TENORMIN) 25 MG tablet Take 1 tablet (25 mg) by mouth daily 90 tablet 1     ezetimibe (ZETIA) 10 MG tablet Take 1 tablet (10 mg) by mouth daily 90 tablet 0     IBUPROFEN PO Take  by mouth.       Loratadine (CLARITIN PO) Possibly with D       rosuvastatin (CRESTOR) 40 MG tablet Take 1 tablet (40 mg) by mouth daily 90 tablet 0       ALLERGIES     Allergies   Allergen Reactions     Crestor [Rosuvastatin] Muscle Pain (Myalgia)       PAST MEDICAL HISTORY:  Past Medical History:   Diagnosis Date     Back pain      CAD (coronary artery disease)     cardiac cath 2006: MARIBELL to PDA     Herpes pharyngitis      Hyperlipidemia LDL goal < 70      Impaired glucose tolerance      Myocardial infarction (H) 10/2006    inferior     Peripheral edema      Psoriasis      Tobacco abuse        PAST SURGICAL HISTORY:  Past Surgical History:   Procedure Laterality Date     bilateral cataract extractions      w/ IOL implant     HEART CATH, ANGIOPLASTY  OCt  2006    successful stent to PDa,        FAMILY HISTORY:  Family History   Problem Relation Age of Onset     Other Cancer Mother      Mental Illness Father        SOCIAL HISTORY:  Social History     Socioeconomic History     Marital status:      Spouse name: None     Number of children: None     Years of education: None     Highest education level: None   Tobacco Use     Smoking status: Some Days     Types: Cigars     Smokeless tobacco: Never     Tobacco comments:     2-3 cigars most days but not every day   Substance and Sexual Activity     Alcohol use: Yes  "    Drug use: No   Other Topics Concern     Parent/sibling w/ CABG, MI or angioplasty before 65F 55M? Yes     Caffeine Concern Yes     Comment: 02-03 cups a day     Sleep Concern No     Stress Concern No     Weight Concern No     Special Diet Yes     Comment: eat healthy      Exercise Yes     Comment: golf      Seat Belt Yes       Review of Systems:  Skin:  Negative       Eyes:  Negative      ENT:  Negative      Respiratory:  Negative       Cardiovascular:    Positive for;dizziness in bed  Gastroenterology: Negative      Genitourinary:  Negative      Musculoskeletal:  Negative      Neurologic:  Negative      Psychiatric:  Negative      Heme/Lymph/Imm:  Negative      Endocrine:  Negative        Physical Exam:  Vitals: /72   Pulse 55   Ht 1.778 m (5' 10\")   Wt 93.8 kg (206 lb 14.4 oz)   SpO2 96%   BMI 29.69 kg/m      Constitutional:  cooperative, alert and oriented, well developed, well nourished, in no acute distress overweight      Skin:  warm and dry to the touch, no apparent skin lesions or masses noted          Head:  normocephalic, no masses or lesions        Eyes:  pupils equal and round;conjunctivae and lids unremarkable xanthalasma      Lymph:      ENT:  no pallor or cyanosis, dentition good        Neck:  carotid pulses are full and equal bilaterally;no carotid bruit        Respiratory:  normal breath sounds, clear to auscultation, normal A-P diameter, normal symmetry, normal respiratory excursion, no use of accessory muscles         Cardiac: regular rhythm;no murmurs, gallops or rubs detected                pulses full and equal                                        GI:           Extremities and Muscular Skeletal:  no spinal abnormalities noted;normal muscle strength and tone   bilateral LE edema;trace;varicose vein          Neurological:  no gross motor deficits        Psych:  affect appropriate, oriented to time, person and place        CC  Martin Govea MD  [address]  [place], "  [id number]                  Thank you for allowing me to participate in the care of your patient.      Sincerely,     Martin Govea MD     [facility]  cc:   Martin Govea MD  [address]  [place] [id number]

## 2024-11-13 DIAGNOSIS — I25.10 CORONARY ARTERY DISEASE INVOLVING NATIVE CORONARY ARTERY OF NATIVE HEART WITHOUT ANGINA PECTORIS: ICD-10-CM

## 2024-11-13 RX ORDER — EZETIMIBE 10 MG/1
10 TABLET ORAL DAILY
Qty: 90 TABLET | Refills: 3 | Status: SHIPPED | OUTPATIENT
Start: 2024-11-13

## 2024-11-13 RX ORDER — ROSUVASTATIN CALCIUM 40 MG/1
40 TABLET, COATED ORAL DAILY
Qty: 90 TABLET | Refills: 3 | Status: SHIPPED | OUTPATIENT
Start: 2024-11-13

## 2024-12-26 ENCOUNTER — TELEPHONE (OUTPATIENT)
Dept: CARDIOLOGY | Facility: CLINIC | Age: 69
End: 2024-12-26
Payer: COMMERCIAL

## 2024-12-26 DIAGNOSIS — I71.40 ABDOMINAL AORTIC ANEURYSM (H): Primary | ICD-10-CM

## 2024-12-26 NOTE — TELEPHONE ENCOUNTER
Received a letter from the patient with results of spine imaging showing dilated abdominal aorta. This was an Xray at his chiropractor.    Reports states: atherosclerosis of abdominal aorta and iliac arteries with possible 4 cm dilatation at the level of L3-4.   Recommend: abdominal CT and vascular specialty referral    Patient is asking if he should have a vascular referral and what imaging would be best?    [id number] spoke with patient to review that we will update Dr. Govea for follow up    Will message Dr. Govea to review  Copy of letter and reports sent to scan

## 2024-12-26 NOTE — TELEPHONE ENCOUNTER
Reply from Dr. Govea:  CT scan of abdomen with contrast     Order placed for CT abdomen with contrast.    Called patient with update. He will call the [facility] to schedule.

## 2025-01-02 DIAGNOSIS — I25.10 CORONARY ARTERY DISEASE INVOLVING NATIVE CORONARY ARTERY OF NATIVE HEART WITHOUT ANGINA PECTORIS: ICD-10-CM

## 2025-01-02 RX ORDER — ATENOLOL 25 MG/1
25 TABLET ORAL DAILY
Qty: 90 TABLET | Refills: 3 | Status: SHIPPED | OUTPATIENT
Start: 2025-01-02

## 2025-01-03 ENCOUNTER — ANCILLARY PROCEDURE (OUTPATIENT)
Dept: CT IMAGING | Facility: CLINIC | Age: 70
End: 2025-01-03
Attending: INTERNAL MEDICINE
Payer: COMMERCIAL

## 2025-01-03 DIAGNOSIS — I71.40 ABDOMINAL AORTIC ANEURYSM: ICD-10-CM

## 2025-01-03 PROCEDURE — 250N000011 HC RX IP 250 OP 636: Performed by: INTERNAL MEDICINE

## 2025-01-03 PROCEDURE — 250N000009 HC RX 250: Performed by: INTERNAL MEDICINE

## 2025-01-03 PROCEDURE — 74160 CT ABDOMEN W/CONTRAST: CPT

## 2025-01-03 RX ORDER — IOPAMIDOL 755 MG/ML
98 INJECTION, SOLUTION INTRAVASCULAR ONCE
Status: COMPLETED | OUTPATIENT
Start: 2025-01-03 | End: 2025-01-03

## 2025-01-03 RX ADMIN — SODIUM CHLORIDE 40 ML: 9 INJECTION, SOLUTION INTRAVENOUS at 07:06

## 2025-01-03 RX ADMIN — IOPAMIDOL 98 ML: 755 INJECTION, SOLUTION INTRAVENOUS at 07:06

## 2025-08-21 DIAGNOSIS — I25.10 CORONARY ARTERY DISEASE INVOLVING NATIVE CORONARY ARTERY OF NATIVE HEART WITHOUT ANGINA PECTORIS: ICD-10-CM

## 2025-08-21 DIAGNOSIS — E78.2 MIXED HYPERLIPIDEMIA: Primary | ICD-10-CM

## 2025-08-25 ENCOUNTER — OFFICE VISIT (OUTPATIENT)
Dept: CARDIOLOGY | Facility: CLINIC | Age: 70
End: 2025-08-25
Payer: COMMERCIAL

## 2025-08-25 VITALS
WEIGHT: 212.5 LBS | BODY MASS INDEX: 30.42 KG/M2 | DIASTOLIC BLOOD PRESSURE: 70 MMHG | SYSTOLIC BLOOD PRESSURE: 108 MMHG | OXYGEN SATURATION: 95 % | HEIGHT: 70 IN | HEART RATE: 56 BPM

## 2025-08-25 DIAGNOSIS — I25.10 CORONARY ARTERY DISEASE INVOLVING NATIVE CORONARY ARTERY OF NATIVE HEART WITHOUT ANGINA PECTORIS: ICD-10-CM

## 2025-08-25 PROCEDURE — 99214 OFFICE O/P EST MOD 30 MIN: CPT | Performed by: INTERNAL MEDICINE

## 2025-08-25 PROCEDURE — 3074F SYST BP LT 130 MM HG: CPT | Performed by: INTERNAL MEDICINE

## 2025-08-25 PROCEDURE — 3048F LDL-C <100 MG/DL: CPT | Performed by: INTERNAL MEDICINE

## 2025-08-25 PROCEDURE — 3078F DIAST BP <80 MM HG: CPT | Performed by: INTERNAL MEDICINE

## 2025-08-25 RX ORDER — EZETIMIBE 10 MG/1
10 TABLET ORAL DAILY
Qty: 90 TABLET | Refills: 4 | Status: SHIPPED | OUTPATIENT
Start: 2025-08-25

## 2025-08-25 RX ORDER — ROSUVASTATIN CALCIUM 40 MG/1
40 TABLET, COATED ORAL DAILY
Qty: 90 TABLET | Refills: 4 | Status: SHIPPED | OUTPATIENT
Start: 2025-08-25

## 2025-08-25 RX ORDER — ATENOLOL 25 MG/1
25 TABLET ORAL DAILY
Qty: 90 TABLET | Refills: 4 | Status: SHIPPED | OUTPATIENT
Start: 2025-08-25